# Patient Record
Sex: MALE | Race: WHITE | NOT HISPANIC OR LATINO | Employment: FULL TIME | RURAL
[De-identification: names, ages, dates, MRNs, and addresses within clinical notes are randomized per-mention and may not be internally consistent; named-entity substitution may affect disease eponyms.]

---

## 2022-03-08 ENCOUNTER — OUTSIDE PLACE OF SERVICE (OUTPATIENT)
Dept: CARDIOLOGY | Facility: CLINIC | Age: 62
End: 2022-03-08
Payer: COMMERCIAL

## 2022-03-08 PROCEDURE — 93306 PR ECHO HEART XTHORACIC,COMPLETE W DOPPLER: ICD-10-PCS | Mod: 26,,, | Performed by: INTERNAL MEDICINE

## 2022-03-08 PROCEDURE — 93306 TTE W/DOPPLER COMPLETE: CPT | Mod: 26,,, | Performed by: INTERNAL MEDICINE

## 2022-03-10 ENCOUNTER — OFFICE VISIT (OUTPATIENT)
Dept: CARDIOLOGY | Facility: CLINIC | Age: 62
End: 2022-03-10
Payer: COMMERCIAL

## 2022-03-10 VITALS
DIASTOLIC BLOOD PRESSURE: 46 MMHG | BODY MASS INDEX: 27.16 KG/M2 | WEIGHT: 163 LBS | SYSTOLIC BLOOD PRESSURE: 104 MMHG | OXYGEN SATURATION: 95 % | HEART RATE: 64 BPM | HEIGHT: 65 IN

## 2022-03-10 DIAGNOSIS — I48.91 ATRIAL FIBRILLATION, UNSPECIFIED TYPE: Primary | ICD-10-CM

## 2022-03-10 DIAGNOSIS — N40.0 BENIGN PROSTATIC HYPERPLASIA, UNSPECIFIED WHETHER LOWER URINARY TRACT SYMPTOMS PRESENT: ICD-10-CM

## 2022-03-10 PROCEDURE — 1159F MED LIST DOCD IN RCRD: CPT | Mod: CPTII,,, | Performed by: INTERNAL MEDICINE

## 2022-03-10 PROCEDURE — 3078F PR MOST RECENT DIASTOLIC BLOOD PRESSURE < 80 MM HG: ICD-10-PCS | Mod: CPTII,,, | Performed by: INTERNAL MEDICINE

## 2022-03-10 PROCEDURE — 1160F PR REVIEW ALL MEDS BY PRESCRIBER/CLIN PHARMACIST DOCUMENTED: ICD-10-PCS | Mod: CPTII,,, | Performed by: INTERNAL MEDICINE

## 2022-03-10 PROCEDURE — 3074F SYST BP LT 130 MM HG: CPT | Mod: CPTII,,, | Performed by: INTERNAL MEDICINE

## 2022-03-10 PROCEDURE — 3008F BODY MASS INDEX DOCD: CPT | Mod: CPTII,,, | Performed by: INTERNAL MEDICINE

## 2022-03-10 PROCEDURE — 93246 EXT ECG>7D<15D RECORDING: CPT | Performed by: INTERNAL MEDICINE

## 2022-03-10 PROCEDURE — 3078F DIAST BP <80 MM HG: CPT | Mod: CPTII,,, | Performed by: INTERNAL MEDICINE

## 2022-03-10 PROCEDURE — 1159F PR MEDICATION LIST DOCUMENTED IN MEDICAL RECORD: ICD-10-PCS | Mod: CPTII,,, | Performed by: INTERNAL MEDICINE

## 2022-03-10 PROCEDURE — 3008F PR BODY MASS INDEX (BMI) DOCUMENTED: ICD-10-PCS | Mod: CPTII,,, | Performed by: INTERNAL MEDICINE

## 2022-03-10 PROCEDURE — 3074F PR MOST RECENT SYSTOLIC BLOOD PRESSURE < 130 MM HG: ICD-10-PCS | Mod: CPTII,,, | Performed by: INTERNAL MEDICINE

## 2022-03-10 PROCEDURE — 99214 OFFICE O/P EST MOD 30 MIN: CPT | Mod: ,,, | Performed by: INTERNAL MEDICINE

## 2022-03-10 PROCEDURE — 1160F RVW MEDS BY RX/DR IN RCRD: CPT | Mod: CPTII,,, | Performed by: INTERNAL MEDICINE

## 2022-03-10 PROCEDURE — 99214 PR OFFICE/OUTPT VISIT, EST, LEVL IV, 30-39 MIN: ICD-10-PCS | Mod: ,,, | Performed by: INTERNAL MEDICINE

## 2022-03-10 RX ORDER — TAMSULOSIN HYDROCHLORIDE 0.4 MG/1
CAPSULE ORAL DAILY
COMMUNITY

## 2022-03-10 RX ORDER — DULOXETIN HYDROCHLORIDE 20 MG/1
20 CAPSULE, DELAYED RELEASE ORAL 2 TIMES DAILY
COMMUNITY

## 2022-03-10 RX ORDER — ASPIRIN 81 MG/1
81 TABLET ORAL DAILY
Status: ON HOLD | COMMUNITY
End: 2022-12-14 | Stop reason: HOSPADM

## 2022-03-10 RX ORDER — METOPROLOL SUCCINATE 50 MG/1
50 TABLET, EXTENDED RELEASE ORAL DAILY
COMMUNITY
End: 2022-11-29 | Stop reason: ALTCHOICE

## 2022-03-17 NOTE — PROGRESS NOTES
Cardiology Progress Note    Referring Physician: Pippa Portillo DO  Primary Care Physician:  Pippa Portillo DO    Reason for Referral: A fib with RVR    Chief Complaint: Palpitations    History of Present Illness: PT reports was resting comfortably watching TV, had sudden onset of his heart racing, associated with mild shortness of breath.  He notes lasted fifteen to twenty minutes before he sought care in ER.  Was found to be in A fib with RVR, with intermitent pauses, was placed on IV diltiazem with some improvement in symptoms.  He was watched overnight, thinks he stayed in A fib for six to eight hours before he converted back to NSR.  He did not require DC cardioversion.  He has not had reoccurence of those symptoms. He has no previous history of  A fib, cardiac issues or evaluations.  He has been under increased stress with recent death of his mother, had attended her  earlier in the day. He denies chest pain, pressure or diaphoresis with this event or with normal activites.    Review of Systems:  Review of Systems   Constitutional: Negative.    Eyes: Negative.    Respiratory: Positive for shortness of breath. Negative for cough, hemoptysis, sputum production and wheezing.    Cardiovascular: Positive for palpitations. Negative for chest pain, orthopnea, claudication, leg swelling and PND.   Gastrointestinal: Negative.    Genitourinary: Negative.    Musculoskeletal: Negative.    Skin: Negative.    Neurological: Negative.    Endo/Heme/Allergies: Negative.    Psychiatric/Behavioral: Positive for depression. Negative for hallucinations, memory loss, substance abuse and suicidal ideas. The patient is not nervous/anxious and does not have insomnia.         History reviewed. No pertinent past medical history.     History reviewed. No pertinent surgical history.    Current Outpatient Medications:     aspirin (ECOTRIN) 81 MG EC tablet, Take 81 mg by mouth once daily., Disp: , Rfl:     DULoxetine (CYMBALTA) 20  "MG capsule, Take 20 mg by mouth 2 (two) times daily., Disp: , Rfl:     metoprolol succinate (TOPROL-XL) 50 MG 24 hr tablet, Take 50 mg by mouth once daily., Disp: , Rfl:     tamsulosin (FLOMAX) 0.4 mg Cap, Take by mouth once daily., Disp: , Rfl:     Social History     Socioeconomic History    Marital status:    Tobacco Use    Smoking status: Never Smoker    Smokeless tobacco: Never Used       Echocardiogram:   No results found for this or any previous visit.      Stress Test:  No results found for this or any previous visit.       Last catheterization:  No results found for this or any previous visit.       Physical Exam:  VS: BP (!) 104/46 (BP Location: Left arm)   Pulse 64   Ht 5' 5" (1.651 m)   Wt 73.9 kg (163 lb)   SpO2 95%   BMI 27.12 kg/m²   Physical Exam   Constitutional: He is oriented to person, place, and time.   HENT:   Head: Normocephalic and atraumatic.   Right Ear: Tympanic membrane normal.   Left Ear: Tympanic membrane normal.   Nose: Nose normal.   Mouth/Throat: Mucous membranes are dry. No oropharyngeal exudate or posterior oropharyngeal erythema. Oropharynx is clear.   Eyes: Pupils are equal, round, and reactive to light. Conjunctivae are normal. No scleral icterus.   Neck: Carotid bruit is not present.   Cardiovascular: Normal rate, regular rhythm, normal heart sounds and normal pulses. Exam reveals no gallop and no friction rub.   No murmur heard.  Pulmonary/Chest: Effort normal and breath sounds normal. He has no wheezes. He has no rhonchi. He has no rales. He exhibits no tenderness.   Abdominal: Soft. Bowel sounds are normal. He exhibits no distension. There is no abdominal tenderness. There is guarding.   Musculoskeletal:         General: No swelling or deformity. Normal range of motion.      Cervical back: Normal range of motion and neck supple.      Right lower leg: No edema.      Left lower leg: No edema.   Lymphadenopathy:     He has no cervical adenopathy.   Neurological: " He is alert and oriented to person, place, and time. No cranial nerve deficit. Coordination abnormal. Gait normal.   Skin: Skin is warm and dry. Capillary refill takes less than 2 seconds. No bruising noted. No jaundice.   Psychiatric: His behavior is normal. Mood normal.        Diagnostic studies reviewed up until the time of this note.     ECG and cardiac telemetry reviewed.     Assessment and Plan      Atrial fibrillation, unspecified type  -     Cardiac Monitor - 3-15 Day Adult (Cupid Only); Future  -     Exercise Stress - EKG; Future; Expected date: 03/24/2022       1. Paroxysmal A fib, back in NSR on metoprolol, tolerating metoprolol and ASA, will order stress imaging to evaluate for ischemic substrate, echo to eval for structural heart disese, left atrial size.  2. BPH: on flowmax             Edwardo Peraza DO, FACC, FACOI  [facility]

## 2022-03-24 ENCOUNTER — OUTSIDE PLACE OF SERVICE (OUTPATIENT)
Dept: CARDIOLOGY | Facility: CLINIC | Age: 62
End: 2022-03-24
Payer: COMMERCIAL

## 2022-03-24 PROCEDURE — 93018 CV STRESS TEST I&R ONLY: CPT | Mod: ,,, | Performed by: INTERNAL MEDICINE

## 2022-03-24 PROCEDURE — 93018 PR CARDIAC STRESS TST,INTERP/REPT ONLY: ICD-10-PCS | Mod: ,,, | Performed by: INTERNAL MEDICINE

## 2022-03-24 PROCEDURE — 93016 CV STRESS TEST SUPVJ ONLY: CPT | Mod: ,,, | Performed by: INTERNAL MEDICINE

## 2022-03-24 PROCEDURE — 93016 PR CARDIAC STRESS TST,DR SUPERV ONLY: ICD-10-PCS | Mod: ,,, | Performed by: INTERNAL MEDICINE

## 2022-04-07 ENCOUNTER — OFFICE VISIT (OUTPATIENT)
Dept: CARDIOLOGY | Facility: CLINIC | Age: 62
End: 2022-04-07
Payer: COMMERCIAL

## 2022-04-07 VITALS — HEART RATE: 64 BPM | OXYGEN SATURATION: 100 % | HEIGHT: 65 IN | BODY MASS INDEX: 27.32 KG/M2 | WEIGHT: 164 LBS

## 2022-04-07 DIAGNOSIS — I48.0 PAROXYSMAL ATRIAL FIBRILLATION: Primary | ICD-10-CM

## 2022-04-07 PROCEDURE — 99213 PR OFFICE/OUTPT VISIT, EST, LEVL III, 20-29 MIN: ICD-10-PCS | Mod: ,,, | Performed by: INTERNAL MEDICINE

## 2022-04-07 PROCEDURE — 3008F BODY MASS INDEX DOCD: CPT | Mod: CPTII,,, | Performed by: INTERNAL MEDICINE

## 2022-04-07 PROCEDURE — 1159F PR MEDICATION LIST DOCUMENTED IN MEDICAL RECORD: ICD-10-PCS | Mod: CPTII,,, | Performed by: INTERNAL MEDICINE

## 2022-04-07 PROCEDURE — 1159F MED LIST DOCD IN RCRD: CPT | Mod: CPTII,,, | Performed by: INTERNAL MEDICINE

## 2022-04-07 PROCEDURE — 99213 OFFICE O/P EST LOW 20 MIN: CPT | Mod: ,,, | Performed by: INTERNAL MEDICINE

## 2022-04-07 PROCEDURE — 1160F PR REVIEW ALL MEDS BY PRESCRIBER/CLIN PHARMACIST DOCUMENTED: ICD-10-PCS | Mod: CPTII,,, | Performed by: INTERNAL MEDICINE

## 2022-04-07 PROCEDURE — 1160F RVW MEDS BY RX/DR IN RCRD: CPT | Mod: CPTII,,, | Performed by: INTERNAL MEDICINE

## 2022-04-07 PROCEDURE — 3008F PR BODY MASS INDEX (BMI) DOCUMENTED: ICD-10-PCS | Mod: CPTII,,, | Performed by: INTERNAL MEDICINE

## 2022-04-07 NOTE — PROGRESS NOTES
Cardiology Clinic Note:    PCP: Pippa Portillo DO    REFERRING PHYSICIAN: Pippa Portillo DO    CHIEF COMPLAINT: Palpitations     HISTORY OF PRESENT ILLNESS: Roberto Lara is a 61 y.o. male who presents for evaluation of atrial fibrillation    Patient states he is doing well.  Reports no further episodes of heart racing and skipping.  He states he is walking the dog daily. Denies any heart racing, skipping or jumping.   He denies chest pain, pressure or diaphoresis.        Review of Systems   Constitutional: Negative for diaphoresis, malaise/fatigue, night sweats and weight gain.   HENT: Negative for congestion, ear pain, hearing loss, nosebleeds and sore throat.    Eyes: Negative for blurred vision, double vision, pain, photophobia and visual disturbance.   Cardiovascular: Positive for palpitations. Negative for chest pain, claudication, dyspnea on exertion, irregular heartbeat, leg swelling, near-syncope, orthopnea and syncope.   Respiratory: Positive for shortness of breath. Negative for cough, sleep disturbances due to breathing, snoring and wheezing.    Endocrine: Negative for cold intolerance, heat intolerance, polydipsia, polyphagia and polyuria.   Hematologic/Lymphatic: Negative for bleeding problem. Does not bruise/bleed easily.   Skin: Negative for dry skin, flushing, itching, rash and skin cancer.   Musculoskeletal: Negative for arthritis, back pain, falls, joint pain, muscle cramps, muscle weakness and myalgias.   Gastrointestinal: Negative for abdominal pain, change in bowel habit, constipation, diarrhea, dysphagia, heartburn, nausea and vomiting.   Genitourinary: Negative for bladder incontinence, dysuria, flank pain, frequency and nocturia.   Neurological: Negative for dizziness, focal weakness, headaches, light-headedness, loss of balance, numbness, paresthesias and seizures.   Psychiatric/Behavioral: Positive for depression. Negative for memory loss and substance abuse. The patient is not  "nervous/anxious.    Allergic/Immunologic: Negative for environmental allergies.          PAST MEDICAL HISTORY:  Palpitations    PAST SURGICAL HISTORY:  No past surgical history on file.    SOCIAL HISTORY:  Social History     Socioeconomic History    Marital status:    Tobacco Use    Smoking status: Never Smoker    Smokeless tobacco: Never Used       FAMILY HISTORY:  Family History   Problem Relation Age of Onset    Heart attack Mother     Hypertension Mother     Heart attack Paternal Uncle     Heart attack Maternal Grandfather        ALLERGIES:  Allergies as of 04/07/2022    (No Known Allergies)         MEDICATIONS:  Current Outpatient Medications on File Prior to Visit   Medication Sig Dispense Refill    aspirin (ECOTRIN) 81 MG EC tablet Take 81 mg by mouth once daily.      DULoxetine (CYMBALTA) 20 MG capsule Take 20 mg by mouth 2 (two) times daily.      metoprolol succinate (TOPROL-XL) 50 MG 24 hr tablet Take 50 mg by mouth once daily.      tamsulosin (FLOMAX) 0.4 mg Cap Take by mouth once daily.       No current facility-administered medications on file prior to visit.          PHYSICAL EXAM:  Pulse 64, height 5' 5" (1.651 m), weight 74.4 kg (164 lb), SpO2 100 %.  Wt Readings from Last 3 Encounters:   04/07/22 74.4 kg (164 lb)   03/10/22 73.9 kg (163 lb)      Body mass index is 27.29 kg/m².    Physical Exam  Vitals and nursing note reviewed.   Constitutional:       Appearance: Normal appearance. He is normal weight.   HENT:      Head: Normocephalic and atraumatic.      Right Ear: External ear normal.      Left Ear: External ear normal.   Eyes:      General: No scleral icterus.        Right eye: No discharge.         Left eye: No discharge.      Extraocular Movements: Extraocular movements intact.      Conjunctiva/sclera: Conjunctivae normal.      Pupils: Pupils are equal, round, and reactive to light.   Cardiovascular:      Rate and Rhythm: Normal rate and regular rhythm.      Pulses: Normal " pulses.      Heart sounds: Normal heart sounds. No murmur heard.    No friction rub. No gallop.   Pulmonary:      Effort: Pulmonary effort is normal.      Breath sounds: Normal breath sounds. No wheezing, rhonchi or rales.   Chest:      Chest wall: No tenderness.   Abdominal:      General: Abdomen is flat. Bowel sounds are normal. There is no distension.      Palpations: Abdomen is soft.      Tenderness: There is no abdominal tenderness. There is no guarding or rebound.   Musculoskeletal:         General: No swelling or tenderness. Normal range of motion.      Cervical back: Normal range of motion and neck supple.   Skin:     General: Skin is warm and dry.      Findings: No erythema or rash.   Neurological:      General: No focal deficit present.      Mental Status: He is alert and oriented to person, place, and time.      Cranial Nerves: No cranial nerve deficit.      Motor: No weakness.      Gait: Gait normal.   Psychiatric:         Mood and Affect: Mood normal.         Behavior: Behavior normal.         Thought Content: Thought content normal.         Judgment: Judgment normal.          LABS REVIEWED:  No results found for: WBC, RBC, HGB, HCT, MCV, MCH, MCHC, RDW, PLT, MPV, NRBC, INR  No results found for: NA, K, CL, CO2, BUN, MG, PHOS  No results found for: CPK, AST, ALT  No results found for: GLU, HGBA1C  No results found for: CHOL, HDL, TRIG, CHOLHDL    CARDIAC STUDIES REVIEWED:  EKG 3/10/22 - Afib with rapid ventricular rate.   ECHO 3/11/22 -  EF 55-60%.  Mild mitral regurgitation and mild tricuspid regurgitation   STRESS TEST 3/24/22 -   ZIO 3/31/22 -   Predominant NSR.  Two short runs of SVT,  Very rare PACs and PVCs.   No significant pauses.  Patient trigger events with NSR and PVCs.     OTHER IMAGING STUDIES REVIEWED:    ASSESSMENT:   There are no diagnoses linked to this encounter.      PLAN:  1. Paroxysmal A fib, back in NSR on metoprolol, tolerating metoprolol and ASA,  Normal echo and stress test  reviewed, continue current meds..

## 2022-04-12 PROBLEM — I48.0 PAROXYSMAL ATRIAL FIBRILLATION: Status: ACTIVE | Noted: 2022-04-12

## 2022-10-27 ENCOUNTER — OFFICE VISIT (OUTPATIENT)
Dept: CARDIOLOGY | Facility: CLINIC | Age: 62
End: 2022-10-27
Payer: COMMERCIAL

## 2022-10-27 VITALS
OXYGEN SATURATION: 97 % | DIASTOLIC BLOOD PRESSURE: 57 MMHG | RESPIRATION RATE: 20 BRPM | WEIGHT: 162 LBS | BODY MASS INDEX: 26.99 KG/M2 | HEART RATE: 61 BPM | HEIGHT: 65 IN | SYSTOLIC BLOOD PRESSURE: 121 MMHG

## 2022-10-27 DIAGNOSIS — I48.0 PAROXYSMAL ATRIAL FIBRILLATION: Primary | ICD-10-CM

## 2022-10-27 PROCEDURE — 1159F PR MEDICATION LIST DOCUMENTED IN MEDICAL RECORD: ICD-10-PCS | Mod: CPTII,,, | Performed by: INTERNAL MEDICINE

## 2022-10-27 PROCEDURE — 3078F PR MOST RECENT DIASTOLIC BLOOD PRESSURE < 80 MM HG: ICD-10-PCS | Mod: CPTII,,, | Performed by: INTERNAL MEDICINE

## 2022-10-27 PROCEDURE — 99213 OFFICE O/P EST LOW 20 MIN: CPT | Mod: ,,, | Performed by: INTERNAL MEDICINE

## 2022-10-27 PROCEDURE — 3078F DIAST BP <80 MM HG: CPT | Mod: CPTII,,, | Performed by: INTERNAL MEDICINE

## 2022-10-27 PROCEDURE — 1160F RVW MEDS BY RX/DR IN RCRD: CPT | Mod: CPTII,,, | Performed by: INTERNAL MEDICINE

## 2022-10-27 PROCEDURE — 1160F PR REVIEW ALL MEDS BY PRESCRIBER/CLIN PHARMACIST DOCUMENTED: ICD-10-PCS | Mod: CPTII,,, | Performed by: INTERNAL MEDICINE

## 2022-10-27 PROCEDURE — 3074F PR MOST RECENT SYSTOLIC BLOOD PRESSURE < 130 MM HG: ICD-10-PCS | Mod: CPTII,,, | Performed by: INTERNAL MEDICINE

## 2022-10-27 PROCEDURE — 3074F SYST BP LT 130 MM HG: CPT | Mod: CPTII,,, | Performed by: INTERNAL MEDICINE

## 2022-10-27 PROCEDURE — 1159F MED LIST DOCD IN RCRD: CPT | Mod: CPTII,,, | Performed by: INTERNAL MEDICINE

## 2022-10-27 PROCEDURE — 99213 PR OFFICE/OUTPT VISIT, EST, LEVL III, 20-29 MIN: ICD-10-PCS | Mod: ,,, | Performed by: INTERNAL MEDICINE

## 2022-10-28 NOTE — PROGRESS NOTES
Cardiology Clinic Note:    PCP: Pippa Portillo DO    REFERRING PHYSICIAN: Pippa Portillo DO    CHIEF COMPLAINT: Palpitations     HISTORY OF PRESENT ILLNESS: Roberto Lara is a 62 y.o. male who presents for evaluation of atrial fibrillation    Patient states heart racing and skipping has resolved..  He states he is walking the dog daily without provocation of heart racing, skipping or jumping.   He denies chest pain, pressure or diaphoresis.  He notes shortness of breath with  a fib with RVR has resolved.        Review of Systems   Constitutional: Negative for diaphoresis, malaise/fatigue, night sweats and weight gain.   HENT:  Negative for congestion, ear pain, hearing loss, nosebleeds and sore throat.    Eyes:  Negative for blurred vision, double vision, pain, photophobia and visual disturbance.   Cardiovascular:  Positive for palpitations. Negative for chest pain, claudication, dyspnea on exertion, irregular heartbeat, leg swelling, near-syncope, orthopnea and syncope.   Respiratory:  Positive for shortness of breath. Negative for cough, sleep disturbances due to breathing, snoring and wheezing.    Endocrine: Negative for cold intolerance, heat intolerance, polydipsia, polyphagia and polyuria.   Hematologic/Lymphatic: Negative for bleeding problem. Does not bruise/bleed easily.   Skin:  Negative for dry skin, flushing, itching, rash and skin cancer.   Musculoskeletal:  Negative for arthritis, back pain, falls, joint pain, muscle cramps, muscle weakness and myalgias.   Gastrointestinal:  Negative for abdominal pain, change in bowel habit, constipation, diarrhea, dysphagia, heartburn, nausea and vomiting.   Genitourinary:  Negative for bladder incontinence, dysuria, flank pain, frequency and nocturia.   Neurological:  Negative for dizziness, focal weakness, headaches, light-headedness, loss of balance, numbness, paresthesias and seizures.   Psychiatric/Behavioral:  Positive for depression. Negative for memory  "loss and substance abuse. The patient is not nervous/anxious.    Allergic/Immunologic: Negative for environmental allergies.        PAST MEDICAL HISTORY:  Palpitations    PAST SURGICAL HISTORY:  Past Surgical History:   Procedure Laterality Date    FOOT FRACTURE SURGERY Right        SOCIAL HISTORY:  Social History     Socioeconomic History    Marital status:    Tobacco Use    Smoking status: Never    Smokeless tobacco: Never       FAMILY HISTORY:  Family History   Problem Relation Age of Onset    Heart attack Mother     Hypertension Mother     Heart attack Paternal Uncle     Heart attack Maternal Grandfather        ALLERGIES:  Allergies as of 10/27/2022    (No Known Allergies)         MEDICATIONS:  Current Outpatient Medications on File Prior to Visit   Medication Sig Dispense Refill    aspirin (ECOTRIN) 81 MG EC tablet Take 81 mg by mouth once daily.      DULoxetine (CYMBALTA) 20 MG capsule Take 20 mg by mouth 2 (two) times daily.      metoprolol succinate (TOPROL-XL) 50 MG 24 hr tablet Take 50 mg by mouth once daily.      tamsulosin (FLOMAX) 0.4 mg Cap Take by mouth once daily.       No current facility-administered medications on file prior to visit.          PHYSICAL EXAM:  Blood pressure (!) 121/57, pulse 61, resp. rate 20, height 5' 5" (1.651 m), weight 73.5 kg (162 lb), SpO2 97 %.  Wt Readings from Last 3 Encounters:   10/27/22 73.5 kg (162 lb)   04/07/22 74.4 kg (164 lb)   03/10/22 73.9 kg (163 lb)      Body mass index is 26.96 kg/m².    Physical Exam  Vitals and nursing note reviewed.   Constitutional:       Appearance: Normal appearance. He is normal weight.   HENT:      Head: Normocephalic and atraumatic.      Right Ear: External ear normal.      Left Ear: External ear normal.   Eyes:      General: No scleral icterus.        Right eye: No discharge.         Left eye: No discharge.      Extraocular Movements: Extraocular movements intact.      Conjunctiva/sclera: Conjunctivae normal.      Pupils: " Pupils are equal, round, and reactive to light.   Cardiovascular:      Rate and Rhythm: Normal rate and regular rhythm.      Pulses: Normal pulses.      Heart sounds: Normal heart sounds. No murmur heard.    No friction rub. No gallop.   Pulmonary:      Effort: Pulmonary effort is normal.      Breath sounds: Normal breath sounds. No wheezing, rhonchi or rales.   Chest:      Chest wall: No tenderness.   Abdominal:      General: Abdomen is flat. Bowel sounds are normal. There is no distension.      Palpations: Abdomen is soft.      Tenderness: There is no abdominal tenderness. There is no guarding or rebound.   Musculoskeletal:         General: No swelling or tenderness. Normal range of motion.      Cervical back: Normal range of motion and neck supple.   Skin:     General: Skin is warm and dry.      Findings: No erythema or rash.   Neurological:      General: No focal deficit present.      Mental Status: He is alert and oriented to person, place, and time.      Cranial Nerves: No cranial nerve deficit.      Motor: No weakness.      Gait: Gait normal.   Psychiatric:         Mood and Affect: Mood normal.         Behavior: Behavior normal.         Thought Content: Thought content normal.         Judgment: Judgment normal.        LABS REVIEWED:  No results found for: WBC, RBC, HGB, HCT, MCV, MCH, MCHC, RDW, PLT, MPV, NRBC, INR  No results found for: NA, K, CL, CO2, BUN, MG, PHOS  No results found for: CPK, AST, ALT  No results found for: GLU, HGBA1C  No results found for: CHOL, HDL, LDL, TRIG, CHOLHDL    CARDIAC STUDIES REVIEWED:  EKG 3/10/22 - Afib with rapid ventricular rate.   ECHO 3/11/22 -  EF 55-60%.  Mild mitral regurgitation and mild tricuspid regurgitation   STRESS TEST 3/24/22 -   ZIO 3/31/22 -   Predominant NSR.  Two short runs of SVT,  Very rare PACs and PVCs.   No significant pauses.  Patient trigger events with NSR and PVCs.     OTHER IMAGING STUDIES REVIEWED:    ASSESSMENT:   There are no diagnoses linked  to this encounter.      PLAN:  1. Paroxysmal A fib, back in NSR on metoprolol, remains asymptomatic, reviewed  Normal echo and stress test reviewed, on ASA only for primary stroke risk reduction with PAF.

## 2022-11-15 ENCOUNTER — HOSPITAL ENCOUNTER (OUTPATIENT)
Dept: RADIOLOGY | Facility: HOSPITAL | Age: 62
Discharge: HOME | End: 2022-11-15
Attending: ORTHOPAEDIC SURGERY
Payer: COMMERCIAL

## 2022-11-15 DIAGNOSIS — M25.562 ACUTE PAIN OF LEFT KNEE: ICD-10-CM

## 2022-11-15 PROBLEM — M25.569 ACUTE KNEE PAIN: Status: ACTIVE | Noted: 2022-11-15

## 2022-11-15 PROCEDURE — 73564 X-RAY EXAM KNEE 4 OR MORE: CPT | Mod: TC,LT

## 2022-11-15 NOTE — H&P (VIEW-ONLY)
CC:   Chief Complaint   Patient presents with    Left Knee - Pain        PREVIOUS INFO:        HISTORY:   11/15/2022    Roberto Lara  is a 62 y.o. slowly increasing left knee pain over the years he works in [employer] up at [facility].  He is had slowly increasing left knee pain throughout the years hurts at night stiffness on rising hurts with activities.      PAST MEDICAL HISTORY: No past medical history on file.       PAST SURGICAL HISTORY:   Past Surgical History:   Procedure Laterality Date    FOOT FRACTURE SURGERY Right           ALLERGIES: Review of patient's allergies indicates:  No Known Allergies     MEDICATIONS :    Current Outpatient Medications:     aspirin (ECOTRIN) 81 MG EC tablet, Take 81 mg by mouth once daily., Disp: , Rfl:     DULoxetine (CYMBALTA) 20 MG capsule, Take 20 mg by mouth 2 (two) times daily., Disp: , Rfl:     metoprolol succinate (TOPROL-XL) 50 MG 24 hr tablet, Take 50 mg by mouth once daily., Disp: , Rfl:     tamsulosin (FLOMAX) 0.4 mg Cap, Take by mouth once daily., Disp: , Rfl:      SOCIAL HISTORY:   Social History     Socioeconomic History    Marital status:    Tobacco Use    Smoking status: Never    Smokeless tobacco: Never        ROS    FAMILY HISTORY:   Family History   Problem Relation Age of Onset    Heart attack Mother     Hypertension Mother     Heart attack Paternal Uncle     Heart attack Maternal Grandfather           PHYSICAL EXAM: There were no vitals filed for this visit.            There is no height or weight on file to calculate BMI.     In general, this is a well-developed, well-nourished male . The patient is alert, oriented and cooperative.      HEENT:  Normocephalic, atraumatic.  Extraocular movements are intact bilaterally.  The oropharynx is benign.       NECK:  Nontender with good range of motion.      PULMONARY: Respirations are even and non-labored.       CARDIOVASCULAR: Pulses regular by peripheral palpation.        ABDOMEN:  Soft, non-tender, non-distended.        EXTREMITIES:  2+ posterior tibial pulse he has full extension flexion about 01/21/2030 but with pain there is grinding with range of motion he has slight valgus deformity    Ortho Exam      RADIOGRAPHIC FINDINGS:  Standing x-rays left knee with the sunrise view in addition total 4 films show significant degenerative changes lateral compartment knee with sclerotic changes probable 2 loose body seen posteriorly inferior from the knee joint no fracture dislocation seen significant DJD left knee      MRI left knee dated 11/10/2022 at [facility]  Torn mediolateral meniscus  Osteoarthritis severe involving the lateral compartment of the knee  Small effusion with popliteal cyst 2 large loose bodies present  Partial PCL injury intact collateral ligaments      .      IMPRESSION:  Left knee DJD risks benefits discussed at length    PLAN:  Information on total knee replacement discussed left knee need clearance through Dr. Peraza his cardiologist had AFib 1st time event in February this year none since      I had a long discussion with the patient about treatment options, including operative and nonoperative treatments. We discussed pros and cons of each including risks pertinent to surgery including pain, infection, bleeding, damage to adjacent structures like nerves and blood vessels, failure to heal, need for future surgeries, stiffness, instability, loss of limb, anesthesia risks like stroke, blood clot, loss of life. We discussed the possibility of need for later hardware removal in the case that hardware was used. We discussed common and uncommon risks, and discussed patient specific factors that may increase the risks present with surgery. All questions were answered. The patient expressed understanding of the pros and cons of surgery and wanted to proceed with surgical treatment.       No follow-ups on file.         Ron Fitzpatrick III      (Subject to voice  recognition error, transcription service not allowed)

## 2022-11-28 ENCOUNTER — OFFICE VISIT (OUTPATIENT)
Dept: CARDIOLOGY | Facility: CLINIC | Age: 62
End: 2022-11-28
Payer: COMMERCIAL

## 2022-11-28 VITALS
BODY MASS INDEX: 44.15 KG/M2 | DIASTOLIC BLOOD PRESSURE: 82 MMHG | SYSTOLIC BLOOD PRESSURE: 140 MMHG | HEART RATE: 64 BPM | WEIGHT: 265 LBS | OXYGEN SATURATION: 96 % | HEIGHT: 65 IN

## 2022-11-28 DIAGNOSIS — I48.0 PAROXYSMAL ATRIAL FIBRILLATION: Primary | ICD-10-CM

## 2022-11-28 DIAGNOSIS — G89.29 CHRONIC PAIN OF LEFT KNEE: ICD-10-CM

## 2022-11-28 DIAGNOSIS — R06.81 APNEA: ICD-10-CM

## 2022-11-28 DIAGNOSIS — G47.33 OBSTRUCTIVE SLEEP APNEA OF ADULT: ICD-10-CM

## 2022-11-28 DIAGNOSIS — M25.562 CHRONIC PAIN OF LEFT KNEE: ICD-10-CM

## 2022-11-28 PROCEDURE — 3077F PR MOST RECENT SYSTOLIC BLOOD PRESSURE >= 140 MM HG: ICD-10-PCS | Mod: CPTII,,, | Performed by: INTERNAL MEDICINE

## 2022-11-28 PROCEDURE — 1159F PR MEDICATION LIST DOCUMENTED IN MEDICAL RECORD: ICD-10-PCS | Mod: CPTII,,, | Performed by: INTERNAL MEDICINE

## 2022-11-28 PROCEDURE — 99214 OFFICE O/P EST MOD 30 MIN: CPT | Mod: PBBFAC | Performed by: INTERNAL MEDICINE

## 2022-11-28 PROCEDURE — 93005 ELECTROCARDIOGRAM TRACING: CPT | Mod: PBBFAC | Performed by: INTERNAL MEDICINE

## 2022-11-28 PROCEDURE — 3079F DIAST BP 80-89 MM HG: CPT | Mod: CPTII,,, | Performed by: INTERNAL MEDICINE

## 2022-11-28 PROCEDURE — 99214 OFFICE O/P EST MOD 30 MIN: CPT | Mod: S$PBB,,, | Performed by: INTERNAL MEDICINE

## 2022-11-28 PROCEDURE — 99214 PR OFFICE/OUTPT VISIT, EST, LEVL IV, 30-39 MIN: ICD-10-PCS | Mod: S$PBB,,, | Performed by: INTERNAL MEDICINE

## 2022-11-28 PROCEDURE — 3008F BODY MASS INDEX DOCD: CPT | Mod: CPTII,,, | Performed by: INTERNAL MEDICINE

## 2022-11-28 PROCEDURE — 1160F RVW MEDS BY RX/DR IN RCRD: CPT | Mod: CPTII,,, | Performed by: INTERNAL MEDICINE

## 2022-11-28 PROCEDURE — 93010 EKG 12-LEAD: ICD-10-PCS | Mod: S$PBB,,, | Performed by: INTERNAL MEDICINE

## 2022-11-28 PROCEDURE — 3079F PR MOST RECENT DIASTOLIC BLOOD PRESSURE 80-89 MM HG: ICD-10-PCS | Mod: CPTII,,, | Performed by: INTERNAL MEDICINE

## 2022-11-28 PROCEDURE — 1160F PR REVIEW ALL MEDS BY PRESCRIBER/CLIN PHARMACIST DOCUMENTED: ICD-10-PCS | Mod: CPTII,,, | Performed by: INTERNAL MEDICINE

## 2022-11-28 PROCEDURE — 1159F MED LIST DOCD IN RCRD: CPT | Mod: CPTII,,, | Performed by: INTERNAL MEDICINE

## 2022-11-28 PROCEDURE — 3077F SYST BP >= 140 MM HG: CPT | Mod: CPTII,,, | Performed by: INTERNAL MEDICINE

## 2022-11-28 PROCEDURE — 93010 ELECTROCARDIOGRAM REPORT: CPT | Mod: S$PBB,,, | Performed by: INTERNAL MEDICINE

## 2022-11-28 PROCEDURE — 3008F PR BODY MASS INDEX (BMI) DOCUMENTED: ICD-10-PCS | Mod: CPTII,,, | Performed by: INTERNAL MEDICINE

## 2022-12-05 PROBLEM — M25.562 CHRONIC PAIN OF LEFT KNEE: Status: ACTIVE | Noted: 2022-11-15

## 2022-12-05 PROBLEM — G89.29 CHRONIC PAIN OF LEFT KNEE: Status: ACTIVE | Noted: 2022-11-15

## 2022-12-05 PROBLEM — G47.33 OBSTRUCTIVE SLEEP APNEA OF ADULT: Status: ACTIVE | Noted: 2022-12-05

## 2022-12-05 PROBLEM — R06.81 APNEA: Status: ACTIVE | Noted: 2022-12-05

## 2022-12-06 NOTE — PROGRESS NOTES
Cardiology Clinic Note:    PCP: Pippa Portillo DO    REFERRING PHYSICIAN: Pippa Portillo DO    CHIEF COMPLAINT: Palpitations     HISTORY OF PRESENT ILLNESS: Roberto Lara is a 62 y.o. male who presents for evaluation of atrial fibrillation    Patient states heart racing and skipping has reoccurred, worsens throughout the day, started taking an extra one half of Toprol 25 XL in the evening which has helped..  He states he is walking the dog daily without provocation of heart racing, skipping or jumping.  He notes increasing left knee pain, is undergoing evaluation for knee replacement.  He denies chest pain, pressure or diaphoresis.  He notes shortness of breath with  a fib with RVR has resolved.        Review of Systems   Constitutional: Negative for diaphoresis, malaise/fatigue, night sweats and weight gain.   HENT:  Negative for congestion, ear pain, hearing loss, nosebleeds and sore throat.    Eyes:  Negative for blurred vision, double vision, pain, photophobia and visual disturbance.   Cardiovascular:  Positive for palpitations. Negative for chest pain, claudication, dyspnea on exertion, irregular heartbeat, leg swelling, near-syncope, orthopnea and syncope.   Respiratory:  Positive for shortness of breath. Negative for cough, sleep disturbances due to breathing, snoring and wheezing.    Endocrine: Negative for cold intolerance, heat intolerance, polydipsia, polyphagia and polyuria.   Hematologic/Lymphatic: Negative for bleeding problem. Does not bruise/bleed easily.   Skin:  Negative for dry skin, flushing, itching, rash and skin cancer.   Musculoskeletal:  Positive for joint pain and joint swelling. Negative for arthritis, back pain, falls, muscle cramps, muscle weakness and myalgias.   Gastrointestinal:  Negative for abdominal pain, change in bowel habit, constipation, diarrhea, dysphagia, heartburn, nausea and vomiting.   Genitourinary:  Negative for bladder incontinence, dysuria, flank pain,  "frequency and nocturia.   Neurological:  Negative for dizziness, focal weakness, headaches, light-headedness, loss of balance, numbness, paresthesias and seizures.   Psychiatric/Behavioral:  Positive for depression. Negative for memory loss and substance abuse. The patient is not nervous/anxious.    Allergic/Immunologic: Negative for environmental allergies.        PAST MEDICAL HISTORY:  Palpitations    PAST SURGICAL HISTORY:  Past Surgical History:   Procedure Laterality Date    FOOT FRACTURE SURGERY Right        SOCIAL HISTORY:  Social History     Socioeconomic History    Marital status:    Tobacco Use    Smoking status: Never    Smokeless tobacco: Never   Substance and Sexual Activity    Alcohol use: Not Currently    Drug use: Never    Sexual activity: Yes       FAMILY HISTORY:  Family History   Problem Relation Age of Onset    Heart attack Mother     Hypertension Mother     Heart attack Paternal Uncle     Heart attack Maternal Grandfather        ALLERGIES:  Allergies as of 11/28/2022    (No Known Allergies)         MEDICATIONS:  Current Outpatient Medications on File Prior to Visit   Medication Sig Dispense Refill    aspirin (ECOTRIN) 81 MG EC tablet Take 81 mg by mouth once daily.      DULoxetine (CYMBALTA) 20 MG capsule Take 20 mg by mouth 2 (two) times daily.      tamsulosin (FLOMAX) 0.4 mg Cap Take by mouth once daily.       No current facility-administered medications on file prior to visit.          PHYSICAL EXAM:  Blood pressure (!) 140/82, pulse 64, height 5' 5" (1.651 m), weight 120.2 kg (265 lb), SpO2 96 %.  Wt Readings from Last 3 Encounters:   11/28/22 120.2 kg (265 lb)   10/27/22 73.5 kg (162 lb)   04/07/22 74.4 kg (164 lb)      Body mass index is 44.1 kg/m².    Physical Exam  Vitals and nursing note reviewed.   Constitutional:       Appearance: Normal appearance. He is normal weight.   HENT:      Head: Normocephalic and atraumatic.      Right Ear: External ear normal.      Left Ear: External " ear normal.   Eyes:      General: No scleral icterus.        Right eye: No discharge.         Left eye: No discharge.      Extraocular Movements: Extraocular movements intact.      Conjunctiva/sclera: Conjunctivae normal.      Pupils: Pupils are equal, round, and reactive to light.   Cardiovascular:      Rate and Rhythm: Normal rate and regular rhythm.      Pulses: Normal pulses.      Heart sounds: Normal heart sounds. No murmur heard.    No friction rub. No gallop.   Pulmonary:      Effort: Pulmonary effort is normal.      Breath sounds: Normal breath sounds. No wheezing, rhonchi or rales.   Chest:      Chest wall: No tenderness.   Abdominal:      General: Abdomen is flat. Bowel sounds are normal. There is no distension.      Palpations: Abdomen is soft.      Tenderness: There is no abdominal tenderness. There is no guarding or rebound.   Musculoskeletal:         General: No swelling or tenderness. Normal range of motion.      Cervical back: Normal range of motion and neck supple.   Skin:     General: Skin is warm and dry.      Findings: No erythema or rash.   Neurological:      General: No focal deficit present.      Mental Status: He is alert and oriented to person, place, and time.      Cranial Nerves: No cranial nerve deficit.      Motor: No weakness.      Gait: Gait normal.   Psychiatric:         Mood and Affect: Mood normal.         Behavior: Behavior normal.         Thought Content: Thought content normal.         Judgment: Judgment normal.        LABS REVIEWED:  No results found for: WBC, RBC, HGB, HCT, MCV, MCH, MCHC, RDW, PLT, MPV, NRBC, INR  No results found for: NA, K, CL, CO2, BUN, MG, PHOS  No results found for: CPK, AST, ALT  No results found for: GLU, HGBA1C  No results found for: CHOL, HDL, LDL, TRIG, CHOLHDL    CARDIAC STUDIES REVIEWED:  EKG 3/10/22 - Afib with rapid ventricular rate.   ECHO 3/11/22 -  EF 55-60%.  Mild mitral regurgitation and mild tricuspid regurgitation   STRESS TEST 3/24/22 -    JUAN M 3/31/22 -   Predominant NSR.  Two short runs of SVT,  Very rare PACs and PVCs.   No significant pauses.  Patient trigger events with NSR and PVCs.     OTHER IMAGING STUDIES REVIEWED:    ASSESSMENT:   Paroxysmal atrial fibrillation  -     EKG 12-lead; Future    Apnea  -     Ambulatory referral/consult to Sleep Disorders; Future; Expected date: 12/05/2022        PLAN:  1. Paroxysmal A fib, back in NSR on metoprolol, heart rate not well controlled, will increase Toprol XL to 25 mg BID, continue to monitor        2. Left knee pain, reviewed  Normal echo and stress test, pt is at moderate cardiovascular risk for planned knee replacement  3. SATISH: did not complete eval, will rerefer to sleep medicine                                                                 Reval in one month, sooner if symptoms do not improve.

## 2022-12-07 ENCOUNTER — HOSPITAL ENCOUNTER (OUTPATIENT)
Dept: RADIOLOGY | Facility: HOSPITAL | Age: 62
Discharge: HOME | End: 2022-12-07
Attending: ORTHOPAEDIC SURGERY
Payer: COMMERCIAL

## 2022-12-07 DIAGNOSIS — Z01.811 PRE-OPERATIVE RESPIRATORY EXAMINATION: ICD-10-CM

## 2022-12-07 PROCEDURE — 71046 X-RAY EXAM CHEST 2 VIEWS: CPT | Mod: 26,,, | Performed by: RADIOLOGY

## 2022-12-07 PROCEDURE — 71046 X-RAY EXAM CHEST 2 VIEWS: CPT | Mod: TC

## 2022-12-07 PROCEDURE — 71046 XR CHEST PA AND LATERAL: ICD-10-PCS | Mod: 26,,, | Performed by: RADIOLOGY

## 2022-12-07 RX ORDER — METOPROLOL TARTRATE 50 MG/1
50 TABLET ORAL 2 TIMES DAILY
COMMUNITY

## 2022-12-14 ENCOUNTER — ANESTHESIA (OUTPATIENT)
Dept: SURGERY | Facility: HOSPITAL | Age: 62
End: 2022-12-14
Payer: COMMERCIAL

## 2022-12-14 ENCOUNTER — HOSPITAL ENCOUNTER (OUTPATIENT)
Facility: HOSPITAL | Age: 62
Discharge: HOME HEALTH | End: 2022-12-15
Attending: ORTHOPAEDIC SURGERY | Admitting: ORTHOPAEDIC SURGERY
Payer: COMMERCIAL

## 2022-12-14 ENCOUNTER — ANESTHESIA EVENT (OUTPATIENT)
Dept: SURGERY | Facility: HOSPITAL | Age: 62
End: 2022-12-14
Payer: COMMERCIAL

## 2022-12-14 DIAGNOSIS — I48.0 PAROXYSMAL A-FIB: ICD-10-CM

## 2022-12-14 DIAGNOSIS — M17.9 DJD (DEGENERATIVE JOINT DISEASE) OF KNEE: Primary | ICD-10-CM

## 2022-12-14 PROBLEM — N40.0 BPH (BENIGN PROSTATIC HYPERPLASIA): Status: ACTIVE | Noted: 2022-12-14

## 2022-12-14 LAB
ANION GAP SERPL CALCULATED.3IONS-SCNC: 13 MMOL/L (ref 7–16)
BASOPHILS # BLD AUTO: 0.02 K/UL (ref 0–0.2)
BASOPHILS NFR BLD AUTO: 0.2 % (ref 0–1)
BUN SERPL-MCNC: 18 MG/DL (ref 7–18)
BUN/CREAT SERPL: 13 (ref 6–20)
CALCIUM SERPL-MCNC: 8.3 MG/DL (ref 8.5–10.1)
CHLORIDE SERPL-SCNC: 105 MMOL/L (ref 98–107)
CO2 SERPL-SCNC: 28 MMOL/L (ref 21–32)
CREAT SERPL-MCNC: 1.36 MG/DL (ref 0.7–1.3)
DIFFERENTIAL METHOD BLD: ABNORMAL
EGFR (NO RACE VARIABLE) (RUSH/TITUS): 58 ML/MIN/1.73M²
EOSINOPHIL # BLD AUTO: 0.04 K/UL (ref 0–0.5)
EOSINOPHIL NFR BLD AUTO: 0.5 % (ref 1–4)
ERYTHROCYTE [DISTWIDTH] IN BLOOD BY AUTOMATED COUNT: 12.7 % (ref 11.5–14.5)
GLUCOSE SERPL-MCNC: 122 MG/DL (ref 74–106)
HCT VFR BLD AUTO: 41.8 % (ref 40–54)
HGB BLD-MCNC: 13.5 G/DL (ref 13.5–18)
IMM GRANULOCYTES # BLD AUTO: 0.04 K/UL (ref 0–0.04)
IMM GRANULOCYTES NFR BLD: 0.5 % (ref 0–0.4)
LYMPHOCYTES # BLD AUTO: 0.82 K/UL (ref 1–4.8)
LYMPHOCYTES NFR BLD AUTO: 10 % (ref 27–41)
MCH RBC QN AUTO: 30.3 PG (ref 27–31)
MCHC RBC AUTO-ENTMCNC: 32.3 G/DL (ref 32–36)
MCV RBC AUTO: 93.9 FL (ref 80–96)
MONOCYTES # BLD AUTO: 0.16 K/UL (ref 0–0.8)
MONOCYTES NFR BLD AUTO: 2 % (ref 2–6)
MPC BLD CALC-MCNC: 9 FL (ref 9.4–12.4)
NEUTROPHILS # BLD AUTO: 7.1 K/UL (ref 1.8–7.7)
NEUTROPHILS NFR BLD AUTO: 86.8 % (ref 53–65)
NRBC # BLD AUTO: 0 X10E3/UL
NRBC, AUTO (.00): 0 %
PLATELET # BLD AUTO: 151 K/UL (ref 150–400)
POTASSIUM SERPL-SCNC: 4.5 MMOL/L (ref 3.5–5.1)
RBC # BLD AUTO: 4.45 M/UL (ref 4.6–6.2)
SODIUM SERPL-SCNC: 141 MMOL/L (ref 136–145)
WBC # BLD AUTO: 8.18 K/UL (ref 4.5–11)

## 2022-12-14 PROCEDURE — D9220A PRA ANESTHESIA: ICD-10-PCS | Mod: CRNA,,, | Performed by: NURSE ANESTHETIST, CERTIFIED REGISTERED

## 2022-12-14 PROCEDURE — 36415 COLL VENOUS BLD VENIPUNCTURE: CPT | Performed by: ORTHOPAEDIC SURGERY

## 2022-12-14 PROCEDURE — 27201423 OPTIME MED/SURG SUP & DEVICES STERILE SUPPLY: Performed by: ORTHOPAEDIC SURGERY

## 2022-12-14 PROCEDURE — 37000008 HC ANESTHESIA 1ST 15 MINUTES: Performed by: ORTHOPAEDIC SURGERY

## 2022-12-14 PROCEDURE — 63600175 PHARM REV CODE 636 W HCPCS

## 2022-12-14 PROCEDURE — 97161 PT EVAL LOW COMPLEX 20 MIN: CPT

## 2022-12-14 PROCEDURE — D9220A PRA ANESTHESIA: Mod: CRNA,,, | Performed by: NURSE ANESTHETIST, CERTIFIED REGISTERED

## 2022-12-14 PROCEDURE — 94761 N-INVAS EAR/PLS OXIMETRY MLT: CPT

## 2022-12-14 PROCEDURE — D9220A PRA ANESTHESIA: ICD-10-PCS | Mod: ANES,,, | Performed by: ANESTHESIOLOGY

## 2022-12-14 PROCEDURE — 37000009 HC ANESTHESIA EA ADD 15 MINS: Performed by: ORTHOPAEDIC SURGERY

## 2022-12-14 PROCEDURE — 97165 OT EVAL LOW COMPLEX 30 MIN: CPT

## 2022-12-14 PROCEDURE — 25000003 PHARM REV CODE 250: Performed by: ANESTHESIOLOGY

## 2022-12-14 PROCEDURE — D9220A PRA ANESTHESIA: Mod: ANES,,, | Performed by: ANESTHESIOLOGY

## 2022-12-14 PROCEDURE — 64447 PERIPHERAL BLOCK: ICD-10-PCS | Mod: 59,LT,, | Performed by: ANESTHESIOLOGY

## 2022-12-14 PROCEDURE — 63600175 PHARM REV CODE 636 W HCPCS: Performed by: NURSE ANESTHETIST, CERTIFIED REGISTERED

## 2022-12-14 PROCEDURE — 64447 NJX AA&/STRD FEMORAL NRV IMG: CPT | Mod: 59,LT,, | Performed by: ANESTHESIOLOGY

## 2022-12-14 PROCEDURE — C1713 ANCHOR/SCREW BN/BN,TIS/BN: HCPCS | Performed by: ORTHOPAEDIC SURGERY

## 2022-12-14 PROCEDURE — 36000712 HC OR TIME LEV V 1ST 15 MIN: Performed by: ORTHOPAEDIC SURGERY

## 2022-12-14 PROCEDURE — 99204 PR OFFICE/OUTPT VISIT, NEW, LEVL IV, 45-59 MIN: ICD-10-PCS | Mod: ,,, | Performed by: INTERNAL MEDICINE

## 2022-12-14 PROCEDURE — C1776 JOINT DEVICE (IMPLANTABLE): HCPCS | Performed by: ORTHOPAEDIC SURGERY

## 2022-12-14 PROCEDURE — 63600175 PHARM REV CODE 636 W HCPCS: Performed by: ANESTHESIOLOGY

## 2022-12-14 PROCEDURE — 99900035 HC TECH TIME PER 15 MIN (STAT)

## 2022-12-14 PROCEDURE — 36000713 HC OR TIME LEV V EA ADD 15 MIN: Performed by: ORTHOPAEDIC SURGERY

## 2022-12-14 PROCEDURE — 71000033 HC RECOVERY, INTIAL HOUR: Performed by: ORTHOPAEDIC SURGERY

## 2022-12-14 PROCEDURE — 63600175 PHARM REV CODE 636 W HCPCS: Performed by: ORTHOPAEDIC SURGERY

## 2022-12-14 PROCEDURE — 25000003 PHARM REV CODE 250: Performed by: INTERNAL MEDICINE

## 2022-12-14 PROCEDURE — 76942 ECHO GUIDE FOR BIOPSY: CPT | Mod: 23,,, | Performed by: ANESTHESIOLOGY

## 2022-12-14 PROCEDURE — 99204 OFFICE O/P NEW MOD 45 MIN: CPT | Mod: ,,, | Performed by: INTERNAL MEDICINE

## 2022-12-14 PROCEDURE — 25000003 PHARM REV CODE 250: Performed by: ORTHOPAEDIC SURGERY

## 2022-12-14 PROCEDURE — 25000003 PHARM REV CODE 250: Performed by: NURSE ANESTHETIST, CERTIFIED REGISTERED

## 2022-12-14 PROCEDURE — 80048 BASIC METABOLIC PNL TOTAL CA: CPT | Performed by: ORTHOPAEDIC SURGERY

## 2022-12-14 PROCEDURE — 99900031 HC PATIENT EDUCATION (STAT)

## 2022-12-14 PROCEDURE — 85025 COMPLETE CBC W/AUTO DIFF WBC: CPT | Performed by: ORTHOPAEDIC SURGERY

## 2022-12-14 PROCEDURE — 76942 PR U/S GUIDANCE FOR NEEDLE GUIDANCE: ICD-10-PCS | Mod: 23,,, | Performed by: ANESTHESIOLOGY

## 2022-12-14 DEVICE — CEMENT BONE SURG SMPLX P RADPQ: Type: IMPLANTABLE DEVICE | Site: KNEE | Status: FUNCTIONAL

## 2022-12-14 DEVICE — PATELLA ATTUNE MEDLZD DOME 35: Type: IMPLANTABLE DEVICE | Site: KNEE | Status: FUNCTIONAL

## 2022-12-14 RX ORDER — MEPERIDINE HYDROCHLORIDE 25 MG/ML
25 INJECTION INTRAMUSCULAR; INTRAVENOUS; SUBCUTANEOUS EVERY 10 MIN PRN
Status: DISCONTINUED | OUTPATIENT
Start: 2022-12-14 | End: 2022-12-14 | Stop reason: HOSPADM

## 2022-12-14 RX ORDER — DULOXETIN HYDROCHLORIDE 20 MG/1
20 CAPSULE, DELAYED RELEASE ORAL 2 TIMES DAILY
Status: DISCONTINUED | OUTPATIENT
Start: 2022-12-14 | End: 2022-12-15 | Stop reason: HOSPADM

## 2022-12-14 RX ORDER — LIDOCAINE HYDROCHLORIDE 20 MG/ML
INJECTION, SOLUTION EPIDURAL; INFILTRATION; INTRACAUDAL; PERINEURAL
Status: DISCONTINUED | OUTPATIENT
Start: 2022-12-14 | End: 2022-12-14

## 2022-12-14 RX ORDER — DEXAMETHASONE SODIUM PHOSPHATE 4 MG/ML
INJECTION, SOLUTION INTRA-ARTICULAR; INTRALESIONAL; INTRAMUSCULAR; INTRAVENOUS; SOFT TISSUE
Status: DISCONTINUED | OUTPATIENT
Start: 2022-12-14 | End: 2022-12-14

## 2022-12-14 RX ORDER — CEFAZOLIN SODIUM 2 G/50ML
2 SOLUTION INTRAVENOUS
Status: COMPLETED | OUTPATIENT
Start: 2022-12-14 | End: 2022-12-15

## 2022-12-14 RX ORDER — CEFAZOLIN SODIUM 1 G/3ML
INJECTION, POWDER, FOR SOLUTION INTRAMUSCULAR; INTRAVENOUS
Status: DISCONTINUED | OUTPATIENT
Start: 2022-12-14 | End: 2022-12-14

## 2022-12-14 RX ORDER — ONDANSETRON 2 MG/ML
4 INJECTION INTRAMUSCULAR; INTRAVENOUS EVERY 8 HOURS PRN
Status: DISCONTINUED | OUTPATIENT
Start: 2022-12-14 | End: 2022-12-15 | Stop reason: HOSPADM

## 2022-12-14 RX ORDER — ROPIVACAINE HYDROCHLORIDE 7.5 MG/ML
INJECTION, SOLUTION EPIDURAL; PERINEURAL
Status: COMPLETED | OUTPATIENT
Start: 2022-12-14 | End: 2022-12-14

## 2022-12-14 RX ORDER — EPINEPHRINE 1 MG/ML
INJECTION, SOLUTION, CONCENTRATE INTRAVENOUS
Status: DISCONTINUED | OUTPATIENT
Start: 2022-12-14 | End: 2022-12-14

## 2022-12-14 RX ORDER — SODIUM CHLORIDE, SODIUM LACTATE, POTASSIUM CHLORIDE, CALCIUM CHLORIDE 600; 310; 30; 20 MG/100ML; MG/100ML; MG/100ML; MG/100ML
INJECTION, SOLUTION INTRAVENOUS CONTINUOUS
Status: DISCONTINUED | OUTPATIENT
Start: 2022-12-14 | End: 2022-12-15 | Stop reason: HOSPADM

## 2022-12-14 RX ORDER — ASPIRIN 325 MG
325 TABLET, DELAYED RELEASE (ENTERIC COATED) ORAL 2 TIMES DAILY
Refills: 0
Start: 2022-12-14 | End: 2023-12-14

## 2022-12-14 RX ORDER — TAMSULOSIN HYDROCHLORIDE 0.4 MG/1
0.4 CAPSULE ORAL DAILY
Status: DISCONTINUED | OUTPATIENT
Start: 2022-12-14 | End: 2022-12-15 | Stop reason: HOSPADM

## 2022-12-14 RX ORDER — KETOROLAC TROMETHAMINE 30 MG/ML
INJECTION, SOLUTION INTRAMUSCULAR; INTRAVENOUS
Status: DISCONTINUED | OUTPATIENT
Start: 2022-12-14 | End: 2022-12-14

## 2022-12-14 RX ORDER — MORPHINE SULFATE 10 MG/ML
4 INJECTION INTRAMUSCULAR; INTRAVENOUS; SUBCUTANEOUS EVERY 5 MIN PRN
Status: DISCONTINUED | OUTPATIENT
Start: 2022-12-14 | End: 2022-12-14 | Stop reason: HOSPADM

## 2022-12-14 RX ORDER — FENTANYL CITRATE 50 UG/ML
INJECTION, SOLUTION INTRAMUSCULAR; INTRAVENOUS
Status: DISCONTINUED | OUTPATIENT
Start: 2022-12-14 | End: 2022-12-14

## 2022-12-14 RX ORDER — SODIUM CHLORIDE, SODIUM LACTATE, POTASSIUM CHLORIDE, CALCIUM CHLORIDE 600; 310; 30; 20 MG/100ML; MG/100ML; MG/100ML; MG/100ML
125 INJECTION, SOLUTION INTRAVENOUS CONTINUOUS
Status: DISCONTINUED | OUTPATIENT
Start: 2022-12-14 | End: 2022-12-15 | Stop reason: HOSPADM

## 2022-12-14 RX ORDER — HYDROMORPHONE HYDROCHLORIDE 2 MG/ML
0.5 INJECTION, SOLUTION INTRAMUSCULAR; INTRAVENOUS; SUBCUTANEOUS EVERY 5 MIN PRN
Status: DISCONTINUED | OUTPATIENT
Start: 2022-12-14 | End: 2022-12-14 | Stop reason: HOSPADM

## 2022-12-14 RX ORDER — TRANEXAMIC ACID 100 MG/ML
INJECTION, SOLUTION INTRAVENOUS
Status: DISCONTINUED | OUTPATIENT
Start: 2022-12-14 | End: 2022-12-14

## 2022-12-14 RX ORDER — DIPHENHYDRAMINE HYDROCHLORIDE 50 MG/ML
25 INJECTION INTRAMUSCULAR; INTRAVENOUS EVERY 6 HOURS PRN
Status: DISCONTINUED | OUTPATIENT
Start: 2022-12-14 | End: 2022-12-14 | Stop reason: HOSPADM

## 2022-12-14 RX ORDER — BUPIVACAINE HYDROCHLORIDE 7.5 MG/ML
INJECTION, SOLUTION EPIDURAL; RETROBULBAR
Status: COMPLETED | OUTPATIENT
Start: 2022-12-14 | End: 2022-12-14

## 2022-12-14 RX ORDER — MORPHINE SULFATE 10 MG/ML
4 INJECTION INTRAMUSCULAR; INTRAVENOUS; SUBCUTANEOUS EVERY 4 HOURS PRN
Status: DISCONTINUED | OUTPATIENT
Start: 2022-12-14 | End: 2022-12-14

## 2022-12-14 RX ORDER — EPHEDRINE SULFATE 50 MG/ML
INJECTION, SOLUTION INTRAVENOUS
Status: DISCONTINUED | OUTPATIENT
Start: 2022-12-14 | End: 2022-12-14

## 2022-12-14 RX ORDER — MORPHINE SULFATE 1 MG/ML
INJECTION, SOLUTION EPIDURAL; INTRATHECAL; INTRAVENOUS
Status: DISCONTINUED | OUTPATIENT
Start: 2022-12-14 | End: 2022-12-14 | Stop reason: HOSPADM

## 2022-12-14 RX ORDER — OXYCODONE HYDROCHLORIDE 5 MG/1
5 TABLET ORAL
Status: DISCONTINUED | OUTPATIENT
Start: 2022-12-14 | End: 2022-12-14 | Stop reason: HOSPADM

## 2022-12-14 RX ORDER — MORPHINE SULFATE 4 MG/ML
4 INJECTION, SOLUTION INTRAMUSCULAR; INTRAVENOUS EVERY 4 HOURS PRN
Status: DISCONTINUED | OUTPATIENT
Start: 2022-12-14 | End: 2022-12-15 | Stop reason: HOSPADM

## 2022-12-14 RX ORDER — METOPROLOL TARTRATE 50 MG/1
50 TABLET ORAL 2 TIMES DAILY
Status: DISCONTINUED | OUTPATIENT
Start: 2022-12-14 | End: 2022-12-15 | Stop reason: HOSPADM

## 2022-12-14 RX ORDER — PROPOFOL 10 MG/ML
VIAL (ML) INTRAVENOUS
Status: DISCONTINUED | OUTPATIENT
Start: 2022-12-14 | End: 2022-12-14

## 2022-12-14 RX ORDER — MIDAZOLAM HYDROCHLORIDE 1 MG/ML
INJECTION INTRAMUSCULAR; INTRAVENOUS
Status: DISCONTINUED | OUTPATIENT
Start: 2022-12-14 | End: 2022-12-14

## 2022-12-14 RX ORDER — ONDANSETRON 2 MG/ML
INJECTION INTRAMUSCULAR; INTRAVENOUS
Status: DISCONTINUED | OUTPATIENT
Start: 2022-12-14 | End: 2022-12-14

## 2022-12-14 RX ORDER — ASPIRIN 325 MG
325 TABLET, DELAYED RELEASE (ENTERIC COATED) ORAL DAILY
Status: DISCONTINUED | OUTPATIENT
Start: 2022-12-15 | End: 2022-12-15 | Stop reason: HOSPADM

## 2022-12-14 RX ORDER — ONDANSETRON 2 MG/ML
4 INJECTION INTRAMUSCULAR; INTRAVENOUS DAILY PRN
Status: DISCONTINUED | OUTPATIENT
Start: 2022-12-14 | End: 2022-12-14 | Stop reason: HOSPADM

## 2022-12-14 RX ORDER — BISACODYL 10 MG
10 SUPPOSITORY, RECTAL RECTAL DAILY PRN
Status: DISCONTINUED | OUTPATIENT
Start: 2022-12-14 | End: 2022-12-15 | Stop reason: HOSPADM

## 2022-12-14 RX ORDER — BUPIVACAINE HYDROCHLORIDE 5 MG/ML
INJECTION, SOLUTION EPIDURAL; INTRACAUDAL
Status: DISCONTINUED | OUTPATIENT
Start: 2022-12-14 | End: 2022-12-14 | Stop reason: HOSPADM

## 2022-12-14 RX ORDER — OXYCODONE AND ACETAMINOPHEN 7.5; 325 MG/1; MG/1
1 TABLET ORAL EVERY 4 HOURS PRN
Qty: 20 TABLET | Refills: 0 | Status: SHIPPED | OUTPATIENT
Start: 2022-12-14 | End: 2023-06-07

## 2022-12-14 RX ORDER — PHENYLEPHRINE HYDROCHLORIDE 10 MG/ML
INJECTION INTRAVENOUS
Status: DISCONTINUED | OUTPATIENT
Start: 2022-12-14 | End: 2022-12-14

## 2022-12-14 RX ORDER — HYDROCODONE BITARTRATE AND ACETAMINOPHEN 5; 325 MG/1; MG/1
1 TABLET ORAL EVERY 4 HOURS PRN
Status: DISCONTINUED | OUTPATIENT
Start: 2022-12-14 | End: 2022-12-15 | Stop reason: HOSPADM

## 2022-12-14 RX ORDER — TOBRAMYCIN 1.2 G/30ML
INJECTION, POWDER, LYOPHILIZED, FOR SOLUTION INTRAVENOUS
Status: DISCONTINUED | OUTPATIENT
Start: 2022-12-14 | End: 2022-12-14 | Stop reason: HOSPADM

## 2022-12-14 RX ADMIN — ROPIVACAINE HYDROCHLORIDE 30 ML: 7.5 INJECTION, SOLUTION EPIDURAL; PERINEURAL at 07:12

## 2022-12-14 RX ADMIN — SODIUM CHLORIDE, POTASSIUM CHLORIDE, SODIUM LACTATE AND CALCIUM CHLORIDE: 600; 310; 30; 20 INJECTION, SOLUTION INTRAVENOUS at 07:12

## 2022-12-14 RX ADMIN — FENTANYL CITRATE 100 MCG: 50 INJECTION INTRAMUSCULAR; INTRAVENOUS at 07:12

## 2022-12-14 RX ADMIN — HYDROCODONE BITARTRATE AND ACETAMINOPHEN 1 TABLET: 5; 325 TABLET ORAL at 02:12

## 2022-12-14 RX ADMIN — LIDOCAINE HYDROCHLORIDE 25 MG: 20 INJECTION, SOLUTION INTRAVENOUS at 07:12

## 2022-12-14 RX ADMIN — BUPIVACAINE HYDROCHLORIDE 1.5 ML: 7.5 INJECTION, SOLUTION EPIDURAL; RETROBULBAR at 07:12

## 2022-12-14 RX ADMIN — EPINEPHRINE 0.01 MCG: 1 INJECTION, SOLUTION, CONCENTRATE INTRAVENOUS at 07:12

## 2022-12-14 RX ADMIN — DULOXETINE 20 MG: 20 CAPSULE, DELAYED RELEASE ORAL at 09:12

## 2022-12-14 RX ADMIN — SODIUM CHLORIDE, POTASSIUM CHLORIDE, SODIUM LACTATE AND CALCIUM CHLORIDE: 600; 310; 30; 20 INJECTION, SOLUTION INTRAVENOUS at 01:12

## 2022-12-14 RX ADMIN — SODIUM CHLORIDE, POTASSIUM CHLORIDE, SODIUM LACTATE AND CALCIUM CHLORIDE: 600; 310; 30; 20 INJECTION, SOLUTION INTRAVENOUS at 08:12

## 2022-12-14 RX ADMIN — HYDROCODONE BITARTRATE AND ACETAMINOPHEN 1 TABLET: 5; 325 TABLET ORAL at 06:12

## 2022-12-14 RX ADMIN — EPHEDRINE SULFATE 10 MG: 50 INJECTION INTRAVENOUS at 07:12

## 2022-12-14 RX ADMIN — TRANEXAMIC ACID 1000 MG: 100 INJECTION, SOLUTION INTRAVENOUS at 09:12

## 2022-12-14 RX ADMIN — CEFAZOLIN 2 G: 1 INJECTION, POWDER, FOR SOLUTION INTRAMUSCULAR; INTRAVENOUS; PARENTERAL at 07:12

## 2022-12-14 RX ADMIN — PHENYLEPHRINE HYDROCHLORIDE 100 MCG: 10 INJECTION INTRAVENOUS at 08:12

## 2022-12-14 RX ADMIN — TRANEXAMIC ACID 1000 MG: 100 INJECTION, SOLUTION INTRAVENOUS at 08:12

## 2022-12-14 RX ADMIN — PHENYLEPHRINE HYDROCHLORIDE 200 MCG: 10 INJECTION INTRAVENOUS at 09:12

## 2022-12-14 RX ADMIN — TAMSULOSIN HYDROCHLORIDE 0.4 MG: 0.4 CAPSULE ORAL at 03:12

## 2022-12-14 RX ADMIN — MIDAZOLAM 2 MG: 1 INJECTION INTRAMUSCULAR; INTRAVENOUS at 08:12

## 2022-12-14 RX ADMIN — VANCOMYCIN HYDROCHLORIDE 1000 MG: 1 INJECTION, POWDER, LYOPHILIZED, FOR SOLUTION INTRAVENOUS at 10:12

## 2022-12-14 RX ADMIN — PHENYLEPHRINE HYDROCHLORIDE 100 MCG: 10 INJECTION INTRAVENOUS at 09:12

## 2022-12-14 RX ADMIN — PROPOFOL 50 MG: 10 INJECTION, EMULSION INTRAVENOUS at 07:12

## 2022-12-14 RX ADMIN — ONDANSETRON 4 MG: 2 INJECTION INTRAMUSCULAR; INTRAVENOUS at 09:12

## 2022-12-14 RX ADMIN — MORPHINE SULFATE 4 MG: 4 INJECTION, SOLUTION INTRAMUSCULAR; INTRAVENOUS at 10:12

## 2022-12-14 RX ADMIN — DEXTROSE MONOHYDRATE 2 G: 50 INJECTION, SOLUTION INTRAVENOUS at 03:12

## 2022-12-14 RX ADMIN — VANCOMYCIN HYDROCHLORIDE 1250 MG: 1 INJECTION, POWDER, LYOPHILIZED, FOR SOLUTION INTRAVENOUS at 07:12

## 2022-12-14 RX ADMIN — EPHEDRINE SULFATE 20 MG: 50 INJECTION INTRAVENOUS at 08:12

## 2022-12-14 RX ADMIN — KETOROLAC TROMETHAMINE 60 MG: 30 INJECTION, SOLUTION INTRAMUSCULAR at 07:12

## 2022-12-14 RX ADMIN — DEXAMETHASONE SODIUM PHOSPHATE 10 MG: 4 INJECTION, SOLUTION INTRA-ARTICULAR; INTRALESIONAL; INTRAMUSCULAR; INTRAVENOUS; SOFT TISSUE at 07:12

## 2022-12-14 RX ADMIN — METOPROLOL TARTRATE 50 MG: 50 TABLET, FILM COATED ORAL at 09:12

## 2022-12-14 NOTE — PLAN OF CARE
Problem: Occupational Therapy  Goal: Occupational Therapy Goal  Description: ST.Pt will perform bathing with Domonique with setup at EOB  2.Pt will perform UE dressing with Judith  3.Pt will perform LE dressing with Domonique with AD if needed  4.Pt will transfer bed/chair/bsc with SBA with RW  5.Pt will perform standing task x 2 min with SBA with RW  6.Tolerate 15 min of tx without fatigue.      LTG:   Restore to max I with selfcare and mobility.      Outcome: Ongoing, Progressing

## 2022-12-14 NOTE — PT/OT/SLP EVAL
Physical Therapy Evaluation      Patient Name: Roberto Lara   MRN: [id number]  Recent Surgery: Procedure(s) (LRB):  ARTHROPLASTY, KNEE, TOTAL, USING COMPUTER-ASSISTED NAVIGATION (Left) Day of Surgery    Recommendations:     Discharge Recommendations: home health PT   Discharge Equipment Recommendations: bedside commode, walker, rolling   Barriers to discharge: None    Assessment:     Roberto Lara is a 62 y.o. male admitted with a medical diagnosis of DJD (degenerative joint disease) of knee. He presents with the following impairments/functional limitations: weakness, impaired functional mobility, gait instability, decreased lower extremity function, pain, decreased ROM. Pt demonstrates good mobility s/p total knee replacement. Pt required minimal assistance with bed moblity and was able to ambulate short distance with rolling walker. Pt to begin ROM in morning.     Rehab Prognosis: Good; patient would benefit from acute skilled PT services to address these deficits and reach maximum level of function.  Recent Surgery: Procedure(s) (LRB):  ARTHROPLASTY, KNEE, TOTAL, USING COMPUTER-ASSISTED NAVIGATION (Left) Day of Surgery    Plan:     During this hospitalization, patient to be seen BID (5x/wk, daily 2x/wk) to address the identified rehab impairments via gait training, therapeutic activities, therapeutic exercises and progress toward the following goals:    Plan of Care Expires: 01/14/23    Subjective     Chief Complaint: left total knee replacement   Patient/Family Comments/Goals: Pt agreeable to PT eval  Pain/Comfort:  Pain Rating 1: 4/10  Location - Side 1: Left  Location 1: knee  Pain Addressed 1: Pre-medicate for activity  Pain Rating Post-Intervention 1: 3/10    Patients cultural, spiritual, Sabianist conflicts given the current situation: no    Social History:  Living Environment: Patient lives with their spouse in a single story home, number of outside stairs: 0 .  Prior Level of Function: Prior to admission,  patient was independent with ADLs.  Equipment Used at Home: none  DME owned (not currently used): none  Assistance Upon Discharge: family    Objective:     Communicated with A Johnathan RN prior to session. Patient found HOB elevated with blood pressure cuff, hemovac, knee immobilizer, cryotherapy, peripheral IV, SCD, pulse ox (continuous) upon PT entry to room.    General Precautions: Standard, fall   Orthopedic Precautions:LLE weight bearing as tolerated   Braces: Knee immobilizer   Respiratory Status: Room air    Exams:  RLE ROM: WFL  RLE Strength: WFL  LLE ROM:  in knee immobilizer  LLE Strength:  impaired post op  Cognitive Exam: Patient is oriented to Person, Place, Time, Situation      Functional Mobility:  Bed Mobility:   Scooting: contact guard assistance  Supine to Sit: minimum assistance  Transfers:  Sit to Stand: minimum assistance with rolling walker with cues for hand placement  Bed to Chair: contact guard assistance with rolling walker using Step Transfer  Gait:   Patient ambulated 30 with rolling walker and contact guard assistance.   Balance:   Sitting: NORMAL: No deviations seen in posture held dynamically  Standing: GOOD: Needs SUPERVISION only during gait and able to self right with moderate LOB      Therapeutic Activities and Exercises:  Patient educated on role of acute care PT and PT POC, safety while in hospital including calling nurse for mobility, and call light usage.      AM-PAC 6 CLICK MOBILITY  Total Score:18     Patient left up in chair with all lines intact and call button in reach.    GOALS:   Multidisciplinary Problems       Physical Therapy Goals          Problem: Physical Therapy    Goal Priority Disciplines Outcome Goal Variances Interventions   Physical Therapy Goal     PT, PT/OT Ongoing, Progressing     Description: Short term goals:  Pt will perform supine to sit with contact guard assistance  Pt will perform sit to stand with contact guard assistance  Pt will transfer from bed  to chair with contact guard assistance using rolling walker  Pt will demonstrate left knee flexion range of motion from 0 to 90 degrees  Pt will ambulate 100 ft with contact guard assistanceusing rolling walker    Long term goals:  Pt will perform supine to sit with modified independence  Pt will perform sit to stand with modified independence  Pt will transfer from bed to chair with modified independence using rolling walker  Pt will ambulate 300 ft with modified independence using rolling walker                         History:     Past Medical History:   Diagnosis Date    Paroxysmal atrial fibrillation        Past Surgical History:   Procedure Laterality Date    cyst wrist Left     FOOT FRACTURE SURGERY Right     URINARY SURGERY         Time Tracking:     PT Received On: 12/14/22  PT Start Time: 1430  PT Stop Time: 1449  PT Total Time (min): 19 min     Billable Minutes: Evaluation low complexity    12/14/2022

## 2022-12-14 NOTE — ANESTHESIA POSTPROCEDURE EVALUATION
Anesthesia Post Evaluation    Patient: Roberto Lara    Procedure(s) Performed: Procedure(s) (LRB):  ARTHROPLASTY, KNEE, TOTAL, USING COMPUTER-ASSISTED NAVIGATION (Left)    Final Anesthesia Type: general      Patient location during evaluation: PACU  Patient participation: Yes- Able to Participate  Level of consciousness: awake and sedated  Post-procedure vital signs: reviewed and stable  Pain management: adequate  Airway patency: patent    PONV status at discharge: No PONV  Anesthetic complications: no      Cardiovascular status: blood pressure returned to baseline  Respiratory status: unassisted  Hydration status: euvolemic  Follow-up not needed.          Vitals Value Taken Time   /74 12/14/22 1200   Temp 36.5 °C (97.7 °F) 12/14/22 1200   Pulse 76 12/14/22 1200   Resp 16 12/14/22 1200   SpO2 97 % 12/14/22 1200         Event Time   Out of Recovery 10:40:00         Pain/Quirino Score: Quirino Score: 10 (12/14/2022 10:35 AM)

## 2022-12-14 NOTE — OR NURSING
1003 REC'D TO PACU IN STABLE COND. PT AWAKE & ALERT. CONNECTED TO BP CUFF, EKG LEADS & SPO2 MONITORS. VS STABLE. PT HAD A TOTAL LT KNEE REPLACEMENT. NO DISTRESS NOTED @ THIS TIME, WILL CONT TO MONITOR.    1035 TRANSFERRED TO ROOM 467 IN STABLE [place]D. ASSISTED ONTO HOSPITAL BED. VS STABLE. BEDS SIDE REPORT GIVEN TO FILIBERTO ACKERMAN. NO DISTRESS NOTED @ THIS TIME. VS 96 % 104  98.3  132/64

## 2022-12-14 NOTE — SUBJECTIVE & OBJECTIVE
Past Medical History:   Diagnosis Date    Paroxysmal atrial fibrillation        Past Surgical History:   Procedure Laterality Date    cyst wrist Left     FOOT FRACTURE SURGERY Right     URINARY SURGERY         Review of patient's allergies indicates:  No Known Allergies    No current facility-administered medications on file prior to encounter.     Current Outpatient Medications on File Prior to Encounter   Medication Sig    DULoxetine (CYMBALTA) 20 MG capsule Take 20 mg by mouth 2 (two) times daily.    metoprolol tartrate (LOPRESSOR) 50 MG tablet Take 50 mg by mouth 2 (two) times daily.    tamsulosin (FLOMAX) 0.4 mg Cap Take by mouth once daily.    [DISCONTINUED] aspirin (ECOTRIN) 81 MG EC tablet Take 81 mg by mouth once daily.     Family History       Problem Relation (Age of Onset)    Heart attack Mother, Paternal Uncle, Maternal Grandfather    Hypertension Mother          Tobacco Use    Smoking status: Never    Smokeless tobacco: Never   Substance and Sexual Activity    Alcohol use: Not Currently    Drug use: Never    Sexual activity: Yes     Review of Systems   Constitutional: Negative.  Negative for chills, fatigue and fever.   HENT: Negative.  Negative for congestion and rhinorrhea.    Respiratory:  Negative for apnea, cough, chest tightness and shortness of breath.    Cardiovascular: Negative.  Negative for chest pain and leg swelling.   Gastrointestinal: Negative.  Negative for abdominal pain, diarrhea, nausea and vomiting.   Endocrine: Negative.    Genitourinary: Negative.    Musculoskeletal:  Positive for arthralgias.   Skin: Negative.    Allergic/Immunologic: Negative.    Neurological: Negative.    Hematological: Negative.    Psychiatric/Behavioral: Negative.     Objective:     Vital Signs (Most Recent):  Temp: 97.7 °F (36.5 °C) (12/14/22 1200)  Pulse: 76 (12/14/22 1400)  Resp: 18 (12/14/22 1403)  BP: 129/63 (12/14/22 1400)  SpO2: 95 % (12/14/22 1400) Vital Signs (24h Range):  Temp:  [97.7 °F (36.5  °C)-98.9 °F (37.2 °C)] 97.7 °F (36.5 °C)  Pulse:  [65-81] 76  Resp:  [8-18] 18  SpO2:  [93 %-98 %] 95 %  BP: (111-168)/(51-90) 129/63     Weight: 72.6 kg (160 lb)  Body mass index is 26.63 kg/m².    Physical Exam  Vitals reviewed.   Constitutional:       Appearance: Normal appearance.   HENT:      Head: Normocephalic and atraumatic.      Nose: Nose normal. No congestion.      Mouth/Throat:      Pharynx: Oropharynx is clear.   Eyes:      Extraocular Movements: Extraocular movements intact.      Conjunctiva/sclera: Conjunctivae normal.      Pupils: Pupils are equal, round, and reactive to light.   Cardiovascular:      Rate and Rhythm: Normal rate and regular rhythm.      Pulses: Normal pulses.      Heart sounds: Normal heart sounds.   Pulmonary:      Effort: Pulmonary effort is normal. No respiratory distress.      Breath sounds: Normal breath sounds. No wheezing.   Abdominal:      General: Bowel sounds are normal. There is no distension.      Palpations: Abdomen is soft.      Tenderness: There is no abdominal tenderness.   Musculoskeletal:         General: Tenderness present.   Lymphadenopathy:      Cervical: No cervical adenopathy.   Skin:     General: Skin is warm.   Neurological:      General: No focal deficit present.      Mental Status: He is alert. Mental status is at baseline.   Psychiatric:         Mood and Affect: Mood normal.         CRANIAL NERVES     CN III, IV, VI   Pupils are equal, round, and reactive to light.     Significant Labs: All pertinent labs within the past 24 hours have been reviewed.    Significant Imaging: I have reviewed all pertinent imaging results/findings within the past 24 hours.

## 2022-12-14 NOTE — PLAN OF CARE
Problem: Adult Inpatient Plan of Care  Goal: Plan of Care Review  12/14/2022 1653 by Aimee Mann RN  Outcome: Ongoing, Progressing  12/14/2022 1355 by Aimee Mann RN  Outcome: Ongoing, Progressing  Goal: Patient-Specific Goal (Individualized)  12/14/2022 1653 by Aimee Mann RN  Outcome: Ongoing, Progressing  12/14/2022 1355 by Aimee Mann RN  Outcome: Ongoing, Progressing  Goal: Absence of Hospital-Acquired Illness or Injury  12/14/2022 1653 by Aimee Mann RN  Outcome: Ongoing, Progressing  12/14/2022 1355 by Aimee Mann RN  Outcome: Ongoing, Progressing  Goal: Optimal Comfort and Wellbeing  12/14/2022 1653 by Aimee Mann RN  Outcome: Ongoing, Progressing  12/14/2022 1355 by Aimee Mann RN  Outcome: Ongoing, Progressing  Goal: Readiness for Transition of Care  12/14/2022 1653 by Aimee Mann RN  Outcome: Ongoing, Progressing  12/14/2022 1355 by Aimee Mann RN  Outcome: Ongoing, Progressing     Problem: Bariatric Environmental Safety  Goal: Safety Maintained with Care  12/14/2022 1653 by Aimee Mann RN  Outcome: Ongoing, Progressing  12/14/2022 1355 by Aimee Mann RN  Outcome: Ongoing, Progressing     Problem: Infection  Goal: Absence of Infection Signs and Symptoms  12/14/2022 1653 by Aimee Mann RN  Outcome: Ongoing, Progressing  12/14/2022 1355 by Aimee Mann RN  Outcome: Ongoing, Progressing

## 2022-12-14 NOTE — PT/OT/SLP EVAL
Occupational Therapy Evaluation      Name: Roberto Lara  MRN: [id number]  Admitting Diagnosis: DJD (degenerative joint disease) of knee Day of Surgery  Recent Surgery: Procedure(s) (LRB):  ARTHROPLASTY, KNEE, TOTAL, USING COMPUTER-ASSISTED NAVIGATION (Left) Day of Surgery    Recommendations:     Discharge Recommendations: home with home health  Level of Assistance Recommended: Intermittent assistance   Discharge Equipment Recommendations: bedside commode, walker, rolling  Barriers to discharge: None    Assessment:   Roberto Lara is a 62 y.o. male with a medical diagnosis of DJD (degenerative joint disease) of knee. He presents with performance deficits affecting function including impaired self care skills, impaired functional mobility, gait instability, impaired balance, pain, orthopedic precautions. Patient tolerated session good. Pt s/p left TKR on 12/14/22.    Rehab Prognosis: Good; patient would benefit from acute skilled OT services to address these deficits and reach maximum level of function.    Plan:     Patient to be seen 5 x/week to address the above listed problems via self-care/home management, therapeutic activities, therapeutic exercises  Plan of Care Expires: 12/21/22  Plan of Care Reviewed with: patient    Subjective     Chief Complaint: s/p left TKR  Patient Comments/Goals: pt agreeable to participate in OT eval  Pain/Comfort:  Pain Rating 1: 3/10  Location - Side 1: Left  Location 1: knee  Pain Addressed 1: Pre-medicate for activity    Patients cultural, spiritual, [employer] conflicts given the current situation: no    Social History:  Living Environment: Patient lives with their spouse in a single story home, number of outside stairs: 0 steps to enter; 1 step inside .  Prior Level of Function: Prior to admission, patient was independent with ADLs, using no AD for ambulation.  Roles and Routines: perform self care  Equipment Used at Home:none  DME owned (not currently used): none  Upon discharge,  patient will have assistance from family.    Objective:     Communicated with TYRON Artis prior to session. Patient found HOB elevated with blood pressure cuff, hemovac, knee immobilizer, cryotherapy, peripheral IV, SCD, pulse ox (continuous) upon OT entry to room.    General Precautions: Standard, fall   Orthopedic Precautions:LLE weight bearing as tolerated   Braces: Knee immobilizer   Respiratory Status: Room air    Occupational Performance    Bed Mobility:  Supine to sit from left side of bed with minimum assistance    Functional Mobility/Transfers:  Sit <> Stand Transfer with minimum assistance with rolling walker  Bed <> Chair Transfer using Step Transfer technique with contact guard assistance with rolling walker  Functional Mobility: pt performed functional mobility with RW with CGA    Activities of Daily Living:  Upper Body Dressing: minimum assistance to shaneka gown as robe    Cognitive/Visual Perceptual:  Cognitive/Psychosocial Skills:     -       Oriented to: Person, Place, Time, and Situation   -       Follows Commands/attention:Follows multistep  commands  -       Mood/Affect/Coping skills/emotional control: Cooperative    Physical Exam:  Balance:    -       WFL with RW  Upper Extremity Range of Motion:     -       Right Upper Extremity: WFL  -       Left Upper Extremity: WFL  Upper Extremity Strength:    -       Right Upper Extremity: WFL  -       Left Upper Extremity: WFL  Gross motor coordination:   WFL    AMPAC 6 Click ADL:  AMPAC Total Score:      Treatment & Education:  Educated on the Importance of mobility to maximize recovery.  Educated on the importance of OOB mobility within safe range in order to decrease adverse effects of prolonged bedrest.  Educated on safety with functional mobility; hand placement to ensure safe transfers to various surfaces in prep for ADLs  Educated on performing functional mobility and ADL's in adherence to orthopedic precautions.  Educated on weight bearing status  Will  continue to educate as needed        Patient clear to ambulate to/from bathroom with RN/PCT, assist x1 .    Patient left up in chair with all lines intact, call button in reach, RN notified, and spouse present.    GOALS:   Multidisciplinary Problems       Occupational Therapy Goals          Problem: Occupational Therapy    Goal Priority Disciplines Outcome Interventions   Occupational Therapy Goal     OT, PT/OT Ongoing, Progressing    Description: ST.Pt will perform bathing with Domonique with setup at EOB  2.Pt will perform UE dressing with Judith  3.Pt will perform LE dressing with Domonique with AD if needed  4.Pt will transfer bed/chair/bsc with SBA with RW  5.Pt will perform standing task x 2 min with SBA with RW  6.Tolerate 15 min of tx without fatigue.      LTG:   Restore to max I with selfcare and mobility.                           History:     Past Medical History:   Diagnosis Date    Paroxysmal atrial fibrillation          Past Surgical History:   Procedure Laterality Date    cyst wrist Left     FOOT FRACTURE SURGERY Right     URINARY SURGERY         Time Tracking:     OT Date of Treatment: 22  OT Start Time: 1431  OT Stop Time: 1448  OT Total Time (min): 17 min    Billable Minutes: Evaluation OT min complexity eval    2022

## 2022-12-14 NOTE — ANESTHESIA PROCEDURE NOTES
Peripheral Block    Patient location during procedure: OR   Block not for primary anesthetic.  Reason for block: at surgeon's request and post-op pain management   Post-op Pain Location: s/p lt tka, p op pain   Start time: 12/14/2022 7:34 AM  Timeout: 12/14/2022 7:33 AM   End time: 12/14/2022 7:40 AM    Staffing  Authorizing Provider: Jitendra Joshua MD  Performing Provider: Jitendra Joshua MD    Preanesthetic Checklist  Completed: patient identified, IV checked, site marked, risks and benefits discussed, surgical consent, monitors and equipment checked, pre-op evaluation and timeout performed  Peripheral Block  Patient position: supine  Prep: ChloraPrep  Patient monitoring: heart rate, cardiac monitor, continuous pulse ox, continuous capnometry and frequent blood pressure checks  Block type: adductor canal  Laterality: left  Injection technique: single shot  Needle  Needle type: Stimuplex   Needle gauge: 20 G  Needle length: 4 in  Needle localization: ultrasound guidance   -ultrasound image captured on disc.  Assessment  Injection assessment: negative aspiration, negative parasthesia and local visualized surrounding nerve  Paresthesia pain: none  Heart rate change: no  Slow fractionated injection: yes  Pain Tolerance: comfortable throughout block and no complaints  Medications:    Medications: ROPIvacaine (NAROPIN) injection 0.75% - Perineural   30 mL - 12/14/2022 7:34:00 AM

## 2022-12-14 NOTE — ANESTHESIA PROCEDURE NOTES
Intubation    Date/Time: 12/14/2022 7:35 AM  Performed by: Megan Silva CRNA  Authorized by: Jitendra Joshua MD     Intubation:     Induction:  Intravenous    Intubated:  Postinduction    Mask Ventilation:  Not attempted    Attempts:  1    Attempted By:  CRNA    Difficult Airway Encountered?: No      Complications:  None    Airway Device:  Supraglottic airway/LMA    Airway Device Size:  4.0    Style/Cuff Inflation:  Cuffed    Inflation Amount (mL):  10    Secured at:  The lips    Placement Verified By:  Capnometry    Complicating Factors:  None    Findings Post-Intubation:  BS equal bilateral and atraumatic/condition of teeth unchanged

## 2022-12-14 NOTE — H&P
[facility]  [facility]  History & Physical    Patient Name: Roberto Lara  MRN: [id number]  Patient Class: OP- Hospital Outpatient Surgery  Admission Date: 12/14/2022  Attending Physician: Ron Fitzptarick III, MD   Primary Care Provider: Pippa Portillo DO         Patient information was obtained from patient, spouse/SO and ER records.     Subjective:     Principal Problem:DJD (degenerative joint disease) of knee    Chief Complaint: No chief complaint on file.       HPI: Patient is a 62-year-old male with past medical history of chronic pain syndrome, benign prostatic hyperplasia, paroxysmal AFib, osteoarthritis sp left Total Knee Arthroplasty , medical team consulted for medical management.  Patient is seen postprocedure, he is currently working with physical therapist and walking, he seems to be comfortable walking, does complain of some pain, however sees it is bearable.  States he just got pain medication and seems to have helped him.  He denies having any chest pain shortness of breath fever chills nausea vomiting or diarrhea.  Patient's spouse is at bedside, states that patient was recently diagnosed with paroxysmal AFib, however was never started on full-dose anticoagulation likely because of chads Vasc being 0.  Patient sees Dr. Peraza in Cardiology, had seen him recently just prior to the surgery.      Past Medical History:   Diagnosis Date    Paroxysmal atrial fibrillation        Past Surgical History:   Procedure Laterality Date    cyst wrist Left     FOOT FRACTURE SURGERY Right     URINARY SURGERY         Review of patient's allergies indicates:  No Known Allergies    No current facility-administered medications on file prior to encounter.     Current Outpatient Medications on File Prior to Encounter   Medication Sig    DULoxetine (CYMBALTA) 20 MG capsule Take 20 mg by mouth 2 (two) times daily.    metoprolol tartrate (LOPRESSOR) 50 MG tablet Take 50 mg by mouth 2 (two)  times daily.    tamsulosin (FLOMAX) 0.4 mg Cap Take by mouth once daily.    [DISCONTINUED] aspirin (ECOTRIN) 81 MG EC tablet Take 81 mg by mouth once daily.     Family History       Problem Relation (Age of Onset)    Heart attack Mother, Paternal Uncle, Maternal Grandfather    Hypertension Mother          Tobacco Use    Smoking status: Never    Smokeless tobacco: Never   Substance and Sexual Activity    Alcohol use: Not Currently    Drug use: Never    Sexual activity: Yes     Review of Systems   Constitutional: Negative.  Negative for chills, fatigue and fever.   HENT: Negative.  Negative for congestion and rhinorrhea.    Respiratory:  Negative for apnea, cough, chest tightness and shortness of breath.    Cardiovascular: Negative.  Negative for chest pain and leg swelling.   Gastrointestinal: Negative.  Negative for abdominal pain, diarrhea, nausea and vomiting.   Endocrine: Negative.    Genitourinary: Negative.    Musculoskeletal:  Positive for arthralgias.   Skin: Negative.    Allergic/Immunologic: Negative.    Neurological: Negative.    Hematological: Negative.    Psychiatric/Behavioral: Negative.     Objective:     Vital Signs (Most Recent):  Temp: 97.7 °F (36.5 °C) (12/14/22 1200)  Pulse: 76 (12/14/22 1400)  Resp: 18 (12/14/22 1403)  BP: 129/63 (12/14/22 1400)  SpO2: 95 % (12/14/22 1400) Vital Signs (24h Range):  Temp:  [97.7 °F (36.5 °C)-98.9 °F (37.2 °C)] 97.7 °F (36.5 °C)  Pulse:  [65-81] 76  Resp:  [8-18] 18  SpO2:  [93 %-98 %] 95 %  BP: (111-168)/(51-90) 129/63     Weight: 72.6 kg (160 lb)  Body mass index is 26.63 kg/m².    Physical Exam  Vitals reviewed.   Constitutional:       Appearance: Normal appearance.   HENT:      Head: Normocephalic and atraumatic.      Nose: Nose normal. No congestion.      Mouth/Throat:      Pharynx: Oropharynx is clear.   Eyes:      Extraocular Movements: Extraocular movements intact.      Conjunctiva/sclera: Conjunctivae normal.      Pupils: Pupils are equal, round,  and reactive to light.   Cardiovascular:      Rate and Rhythm: Normal rate and regular rhythm.      Pulses: Normal pulses.      Heart sounds: Normal heart sounds.   Pulmonary:      Effort: Pulmonary effort is normal. No respiratory distress.      Breath sounds: Normal breath sounds. No wheezing.   Abdominal:      General: Bowel sounds are normal. There is no distension.      Palpations: Abdomen is soft.      Tenderness: There is no abdominal tenderness.   Musculoskeletal:         General: Tenderness present.   Lymphadenopathy:      Cervical: No cervical adenopathy.   Skin:     General: Skin is warm.   Neurological:      General: No focal deficit present.      Mental Status: He is alert. Mental status is at baseline.   Psychiatric:         Mood and Affect: Mood normal.         CRANIAL NERVES     CN III, IV, VI   Pupils are equal, round, and reactive to light.     Significant Labs: All pertinent labs within the past 24 hours have been reviewed.    Significant Imaging: I have reviewed all pertinent imaging results/findings within the past 24 hours.    Assessment/Plan:     * DJD (degenerative joint disease) of knee  Primary team managing, defer pain management and DVT prophylaxis to primary team.      BPH (benign prostatic hyperplasia)  tamsulosin      Chronic pain of left knee  Prn pain meds      Paroxysmal atrial fibrillation  Patient with Paroxysmal (<7 days) atrial fibrillation which is controlled currently with Beta Blocker. Patient is currently in sinus rhythm.[id number] Score 0: The patient doesn't have any registry metric data available. . Anticoagulation not indicated due to likely 0 chadvasc. per them cardiology never started him on it saying he doesnt need it. outpatient cardiology follow up. .          VTE Risk Mitigation (From admission, onward)         Ordered     IP VTE HIGH RISK PATIENT  Once         12/14/22 1011     Place MELISSA hose  Until discontinued         12/14/22 1011                   Rehmat U  MD Sarabjit  Department of Hospital Medicine   [facility]

## 2022-12-14 NOTE — OP NOTE
Department of Orthopedic Surgery    Operative Note  NAME: Roberto Lara    : 1960    SURGERY DATE: 2022     PREOPERATIVE DIAGNOSIS:  Left knee end-stage DJD    POSTOPERATIVE DIAGNOSIS:  Same    PROCEDURE: left Total Knee Arthroplasty computer navigated  Three femur posterior stabilized  5 tibial tray  7 poly posterior stabilized    SURGEON: Ron Fitzpatrick III, MD    ASSISTANT:  Nadeem    ANESTHESIA:  Spinal general adductor canal block    BLOOD LOSS:  100 cc    TOURNIQUET TIME:  See record less than 70 minutes    DRAINS: Hemovac drain x2    IMPLANT:  Implant Name Type Inv. Item Serial No.  Lot No. LRB No. Used Action   CEMENT BONE SURG SMPLX P RADPQ - [id number]  CEMENT BONE SURG SMPLX P RADPQ  [employer]. [id number] Left 1 Implanted   CEMENT BONE SURG SMPLX P RADPQ - [id number]  CEMENT BONE SURG SMPLX P RADPQ  [employer]. [id number] Left 1 Implanted   SYS KNEE EPAK PIN ATTUNE - [id number]  SYS KNEE EPAK PIN ATTUNE  [employer] [id number] Left 1 Implanted and Explanted   DEPUY ATTUNE FEMORAL POSTERIOR STABILIZED SIZE 3 LEFT      [id number] Left 1 Implanted   PATELLA ATTUNE MEDLZD DOME 35 - [id number]  PATELLA ATTUNE MEDLZD DOME 35  [employer]. [id number] Left 1 Implanted   DEPUY ATTUNE TIBIAL BASE FIXED BEARING SIZE 5 CEMENTED      [id number] Left 1 Implanted   DEPUY TIBIAL INSERT SIZE 3 / 7MM      [id number] Left 1 Implanted   PIN SCHANZ [id number] - [id number]  PIN SCHANZ [id number]  [employer]  Left 2 Implanted and Explanted   PIN SCHANZ [id number] - [id number]  PIN SCHANZ [id number]  [employer]  Left 1 Implanted and Explanted         INDICATIONS FOR PROCEDURE:   [unfilled] is a 62 y.o.-year-old with debilitating left-sided knee pain despite nonoperative measures and interested in knee arthroplasty.    PROCEDURE IN DETAIL:  After obtaining informed consent and starting the patient on preoperative IV antibiotics, the patient was taken back to the Operating   Room. Anesthesia was performed by Anesthesia  Team. The left lower  extremity was prepped and draped in normal sterile fashion.  An Esmarch bandage was used to exsanguinate the affected lower extremity and the pneumatic tourniquet  was inflated to 300 mmHg.  A standard longitudinal midline incision was made beginning 3 fingerbreadths above the superior pole of the patella extending down to the tibial tubercle.  Full-thickness skin flaps were raised.  A medial parapatellar arthrotomy was made. The patella was then everted.    A release of the proximal medial tibia and MCL was then undertaken.  The infrapatella fat pad was resected. Two pins were placed in the femur and two pins in the tibia for computer navigation.  The leg was placed through range of motion to get the overall alignment of the femoral head.  Landmarks on the femur and the tibia were then marked.  Using computer navigation 9 mm were resected from the femur with 5 degrees of valgus.  The cut was verified using the computer array.  The size of the femoral prosthesis was re-verified mechanically in addition to the computer verification system.  Chamfer cuts were then made with 3 degrees of external rotation for a size 3 femoral component.  The notch cuts for posterior stabilization were then made.  A trial reduction was performed using the femoral trials.  Attention was then turned to the tibial side.  Once again using computer navigation, a resection of approximately 9 mm was performed with approximately a 3-degree posterior slope.  The mechanical trials were used at this point to measure the appropriate tibial baseplate, which was 5.  The outrigger alecia was then used for rotational verification.  Once the rotation was confirmed with the outrigger alecia, the tibial plateau was drilled then impacted for the stem.  With the trial tibial baseplate and the femoral component in place, the poly was trialed for overall extension, flexion, and balancing.  The best fit poly was 7.  Attention was then turned to  the patella which with the guide set at 9 mm, the cut was made.  It was mechanically sized to a size 35 and the three PEG holes drilled.  A repeat trial reduction once again was performed, verifying the patellar stability, obtaining full extension without hyperextension and good flexion.  The knee was then injected with 10 ccs of Marcaine and 10 ccs of Duramorph in the synovial tissue.  Hemostasis was also obtained being careful to check posterior, medial and lateral.  The knee was copiously irrigated out.  At this time, two bags of surgical Simplex P cement were mixed with 1.2 of tobramycin using third generation vacuum .  Once the cement was thick,  a cement gun was used to inject the tibia and the stem on the baseplate.  Then the tibial baseplate was impacted in place.  Excess cement was removed.  The femoral component was then impacted with extra cement removed.  Trial poly was placed. The leg was placed in full extension with pressure on the leg to maintain extension.  Any excess cement was once again removed at this point.  The patella was then cemented in place and clamped.  Once all the cement had hardened, the trial poly was removed and any excess cement was removed, being careful to check under the medial and lateral femoral condyles for any excess cement.  The knee was copiously irrigated out.  The tourniquet was then released and hemostasis was obtained.  The knee was irrigated out and a posterior stabilized polyethelene was impacted onto the tibial baseplate. The knee was once again placed through range of motion, verifying full extension, stability to varus and valgus stressing and flexion.  Hemostasis once again obtained using Bovie.  The knee was irrigated out, drains were placed x 2 using medium Hemovac.  The capsule was closed with #2 Ti-Cron.  Then 0 and 2-0 Vicryl were used to close the subcutaneous tissue.  The skin was closed with staples.  A silver dressing was applied to the  incision with a bulky sterile dressing around the knee and a compressive dressing from the foot to the thigh.  A cryo-cuff and a knee immobilizer were applied over the dressing.  The patient tolerated the procedure well without complications.           Ron Fitzpatrick III, MD

## 2022-12-14 NOTE — ANESTHESIA PREPROCEDURE EVALUATION
12/14/2022  Roberto Lara is a 62 y.o., male.      Pre-op Assessment    I have reviewed the Patient Summary Reports.     I have reviewed the Nursing Notes. I have reviewed the NPO Status.   I have reviewed the Medications.     Review of Systems  Anesthesia Hx:  No problems with previous Anesthesia    Social:  Non-Smoker, No Alcohol Use    Hematology/Oncology:  Hematology Normal   Oncology Normal     EENT/Dental:EENT/Dental Normal   Cardiovascular:   Dysrhythmias atrial fibrillation    Pulmonary:   Sleep Apnea    Renal/:  Renal/ Normal     Hepatic/GI:  Hepatic/GI Normal    Musculoskeletal:  Musculoskeletal Normal    Neurological:  Neurology Normal    Endocrine:  Endocrine Normal    Dermatological:  Skin Normal    Psych:  Psychiatric Normal           Physical Exam  General: Well nourished    Airway:  Mallampati: II / II  Mouth Opening: Normal  TM Distance: > 6 cm  Tongue: Normal  Neck ROM: Normal ROM    Dental:  Loose teeth  Loose upper dental work  Chest/Lungs:  Clear to auscultation, Normal Respiratory Rate    Heart:  Rate: Normal  Rhythm: Regular Rhythm        Anesthesia Plan  Type of Anesthesia, risks & benefits discussed:    Anesthesia Type: Gen Supraglottic Airway, Spinal, Regional  Intra-op Monitoring Plan: Standard ASA Monitors  Post Op Pain Control Plan: multimodal analgesia  Induction:  IV  Informed Consent: Informed consent signed with the Patient and all parties understand the risks and agree with anesthesia plan.  All questions answered. Patient consented to blood products? Yes  ASA Score: 2  Day of Surgery Review of History & Physical: H&P Update referred to the surgeon/provider.I have interviewed and examined the patient. I have reviewed the patient's H&P dated: There are no significant changes. H&P completed by Anesthesiologist.    Ready For Surgery From Anesthesia Perspective.     .

## 2022-12-14 NOTE — PROGRESS NOTES
Postop left total knee today  Vital signs are still  In room resting  Thigh and calf were soft  Drain in place  Palpable posterior tib pulse  Neuro intact  PT reported patient did well in rehab today they was sick in the morning prior to discharge.    H&H 13 and 42

## 2022-12-14 NOTE — PLAN OF CARE
[facility]  Initial Discharge Assessment       Primary Care Provider: Pippa Portillo DO    Admission Diagnosis: Acute pain of left knee [M25.562]  DJD (degenerative joint disease) of knee [M17.9]    Admission Date: 12/14/2022  Expected Discharge Date: 12/15/2022    Discharge Barriers Identified: None    Payor: [employer] / Plan: [employer] ([employer]) / Product Type: Commercial /     Extended Emergency Contact Information  Primary Emergency Contact: LELO LEZAMA  Mobile Phone: [phone]  Relation: Spouse  Preferred language: English   needed? No    Discharge Plan A: Home Health  Discharge Plan B: Home Health      [facility], [place] - [address] [address]  Phone: [phone] Fax: [phone]    [facility], [place] - [address]  [address] [facility] [address]  Phone: [phone] Fax: [phone]      Initial Assessment (most recent)       Adult Discharge Assessment - 12/14/22 1159          Discharge Assessment    Assessment Type Discharge Planning Assessment     Source of Information patient     Communicated VINCE with patient/caregiver Yes     People in Home spouse     Do you expect to return to your current living situation? Yes     Do you have help at home or someone to help you manage your care at home? Yes     Who are your caregiver(s) and their phone number(s)? lynne Becker [phone]     Prior to hospitilization cognitive status: Alert/Oriented     Current cognitive status: Alert/Oriented     Equipment Currently Used at Home none     Patient currently being followed by outpatient case management? No     Do you currently have service(s) that help you manage your care at home? No     Do you take prescription medications? Yes     Do you have prescription coverage? Yes     Coverage [employer]     Do you have any problems affording any of your prescribed  medications? No     Is the patient taking medications as prescribed? yes     Who is going to help you get home at discharge? wife     How do you get to doctors appointments? car, drives self;family or friend will provide     Are you on dialysis? No     Do you take coumadin? No     Discharge Plan A Home Health     Discharge Plan B Home Health     DME Needed Upon Discharge  bedside commode;walker, rolling     Discharge Plan discussed with: Patient     Discharge Barriers Identified None                   Spoke with patient in his room. Lives with his wife. She is in the room. Patient was independent with ADLs. Drove. Is employed. Patient had no home health and no equipment. Faxed order for rolling walker and bedside commode to [facility]. Called Lydia. Patient gave choice for [facility]. Faxed info and notified Kanika. Dc plan will be home with wife and home health then out patient after dc from home health at [facility].

## 2022-12-14 NOTE — TRANSFER OF CARE
"Anesthesia Transfer of Care Note    Patient: Roberto Lara    Procedure(s) Performed: Procedure(s) (LRB):  ARTHROPLASTY, KNEE, TOTAL, USING COMPUTER-ASSISTED NAVIGATION (Left)    Patient location: PACU    Anesthesia Type: general, regional and spinal    Transport from OR: Transported from OR on room air with adequate spontaneous ventilation    Post pain: adequate analgesia    Post assessment: no apparent anesthetic complications    Post vital signs: stable    Level of consciousness: awake, alert and oriented    Nausea/Vomiting: no nausea/vomiting    Complications: none    Transfer of care protocol was followed      Last vitals:   Visit Vitals  /64 (Patient Position: Lying)   Pulse 79   Temp 36.7 °C (98.1 °F)   Resp (!) 9   Ht 5' 5" (1.651 m)   Wt 72.6 kg (160 lb)   SpO2 (!) 93%   BMI 26.63 kg/m²     "

## 2022-12-14 NOTE — HPI
Patient is a 62-year-old male with past medical history of chronic pain syndrome, benign prostatic hyperplasia, paroxysmal AFib, osteoarthritis sp left Total Knee Arthroplasty , medical team consulted for medical management.  Patient is seen postprocedure, he is currently working with physical therapist and walking, he seems to be comfortable walking, does complain of some pain, however sees it is bearable.  States he just got pain medication and seems to have helped him.  He denies having any chest pain shortness of breath fever chills nausea vomiting or diarrhea.  Patient's spouse is at bedside, states that patient was recently diagnosed with paroxysmal AFib, however was never started on full-dose anticoagulation likely because of chads Vasc being 0.  Patient sees Dr. Peraza in Cardiology, had seen him recently just prior to the surgery.

## 2022-12-14 NOTE — PLAN OF CARE
Problem: Physical Therapy  Goal: Physical Therapy Goal  Description: Short term goals:  Pt will perform supine to sit with contact guard assistance  Pt will perform sit to stand with contact guard assistance  Pt will transfer from bed to chair with contact guard assistance using rolling walker  Pt will demonstrate left knee flexion range of motion from 0 to 90 degrees  Pt will ambulate 100 ft with contact guard assistanceusing rolling walker    Long term goals:  Pt will perform supine to sit with modified independence  Pt will perform sit to stand with modified independence  Pt will transfer from bed to chair with modified independence using rolling walker  Pt will ambulate 300 ft with modified independence using rolling walker    Outcome: Ongoing, Progressing

## 2022-12-14 NOTE — INTERVAL H&P NOTE
The patient has been examined and the H&P has been reviewed:    I concur with the findings and no changes have occurred since H&P was written.    Surgery risks, benefits and alternative options discussed and understood by patient/family.      Please see Dr. Vallejo's note    There are no hospital problems to display for this patient.

## 2022-12-14 NOTE — DISCHARGE INSTRUCTIONS
ANKLE: Pumps        Point toes down, then up. Repeat 30 times, 2 sessions per day        Quad Set        With other leg bent, foot flat, slowly tighten muscles on left thigh of straight leg while counting out loud to 5.  Repeat 30 times, 2 sessions per day.          Hip Abduction / Adduction: with Extended Knee (Supine)        Bring left leg out to side and return. Keep knee straight.  Repeat 30 times, 2 sessions per day.         HIP / KNEE: Flexion, Heel Slides - Supine        Slide left heel up toward buttocks, keeping leg in straight line. Repeat 30 times, 2 sessions per day.  Use towel or pillowcase under heel as needed.       Straight Leg Raise        Bend right leg. Raise left leg 8-12 inches with knee locked. Exhale and tighten thigh muscles while raising leg.   Repeat 30 times, 2 sessions per day.           KNEE: Extension, Long Arc Quads - Sitting        Raise left leg until knee is straight.  Repeat 30 times, 2 sessions per day        KNEE: Flexion / Extension - Sitting        Sit at edge of surface, foot on towel or pillowcase. Bend and straighten left knee.  Repeat 30 times, 2 sessions per day.   Use opposite leg to increase knee flexion.    *Keep dressing dry and intact, do not remove dressing, if dressing becomes wet or bloody notify home health staff.  Swingbed/Home Health will remove your drain (if you have one) on post op day #2 and change your dressing on post op day #3 and day #10, give them that special dressing we sent home with you.  *Continue incentive spirometry at least every 2 hours while awake.  *Continue white stockings remove 2 times a day for 1 hour and replace. Once dressing is changed, apply other stocking to surgery leg.   *Continue cool-jet to knee. Do not apply directly to skin.   *Take laxative of choice to have a bowel movement at least by tomorrow and then every other day.  *Increase fluids by mouth.  *Staples will be removed at follow up appointment in 2 weeks from  surgery.  *Elevate surgery leg on pillow at ankle. No pillow under knees.  *Notify [facility]/home health staff of any concerns.      Curry Catheter removal  Dr. Oreilly told Mr. Lara and his wife to pull his Curry at 3 or 4 o'clock in the morning.  He will call our office tomorrow morning if he can't void and Dr. Oreilly will see him in the office.

## 2022-12-14 NOTE — ANESTHESIA PROCEDURE NOTES
Spinal    Diagnosis: lt tka  Patient location during procedure: OR  Start time: 12/14/2022 7:26 AM  Timeout: 12/14/2022 7:25 AM  End time: 12/14/2022 7:30 AM    Staffing  Authorizing Provider: Jitendra Joshua MD  Performing Provider: Jitendra Joshua MD    Preanesthetic Checklist  Completed: patient identified, IV checked, risks and benefits discussed, surgical consent, monitors and equipment checked, pre-op evaluation and timeout performed  Spinal Block  Patient position: sitting  Prep: Betadine  Patient monitoring: heart rate, continuous pulse ox, continuous capnometry and frequent blood pressure checks  Approach: midline  Location: L3-4  Injection technique: single shot  CSF Fluid: clear free-flowing CSF  Needle  Needle type: Quincke   Needle gauge: 22 G  Needle length: 4 in  Needle localization: anatomical landmarks  Assessment  Sensory level: T8   Dermatomal levels determined by alcohol wipe  Ease of block: easy  Patient's tolerance of the procedure: comfortable throughout block and no complaints  Medications:    Medications: BUPivacaine (pf) (MARCAINE) injection 0.75% - Intraspinal   1.5 mL - 12/14/2022 7:30:00 AM

## 2022-12-15 VITALS
DIASTOLIC BLOOD PRESSURE: 58 MMHG | SYSTOLIC BLOOD PRESSURE: 114 MMHG | HEART RATE: 74 BPM | TEMPERATURE: 98 F | OXYGEN SATURATION: 95 % | RESPIRATION RATE: 18 BRPM | HEIGHT: 65 IN | WEIGHT: 160 LBS | BODY MASS INDEX: 26.66 KG/M2

## 2022-12-15 LAB
ALBUMIN SERPL BCP-MCNC: 2.8 G/DL (ref 3.5–5)
ALBUMIN/GLOB SERPL: 0.9 {RATIO}
ALP SERPL-CCNC: 88 U/L (ref 45–115)
ALT SERPL W P-5'-P-CCNC: 16 U/L (ref 16–61)
ANION GAP SERPL CALCULATED.3IONS-SCNC: 12 MMOL/L (ref 7–16)
AST SERPL W P-5'-P-CCNC: 21 U/L (ref 15–37)
BASOPHILS # BLD AUTO: 0.01 K/UL (ref 0–0.2)
BASOPHILS NFR BLD AUTO: 0.1 % (ref 0–1)
BILIRUB SERPL-MCNC: 0.3 MG/DL (ref ?–1.2)
BUN SERPL-MCNC: 20 MG/DL (ref 7–18)
BUN/CREAT SERPL: 14 (ref 6–20)
CALCIUM SERPL-MCNC: 8.3 MG/DL (ref 8.5–10.1)
CHLORIDE SERPL-SCNC: 103 MMOL/L (ref 98–107)
CO2 SERPL-SCNC: 25 MMOL/L (ref 21–32)
CREAT SERPL-MCNC: 1.46 MG/DL (ref 0.7–1.3)
DIFFERENTIAL METHOD BLD: ABNORMAL
EGFR (NO RACE VARIABLE) (RUSH/TITUS): 54 ML/MIN/1.73M²
EOSINOPHIL # BLD AUTO: 0 K/UL (ref 0–0.5)
EOSINOPHIL NFR BLD AUTO: 0 % (ref 1–4)
ERYTHROCYTE [DISTWIDTH] IN BLOOD BY AUTOMATED COUNT: 12.8 % (ref 11.5–14.5)
GLOBULIN SER-MCNC: 3.1 G/DL (ref 2–4)
GLUCOSE SERPL-MCNC: 166 MG/DL (ref 74–106)
HCT VFR BLD AUTO: 36.8 % (ref 40–54)
HGB BLD-MCNC: 12.1 G/DL (ref 13.5–18)
IMM GRANULOCYTES # BLD AUTO: 0.13 K/UL (ref 0–0.04)
IMM GRANULOCYTES NFR BLD: 0.7 % (ref 0–0.4)
LYMPHOCYTES # BLD AUTO: 0.73 K/UL (ref 1–4.8)
LYMPHOCYTES NFR BLD AUTO: 3.9 % (ref 27–41)
MCH RBC QN AUTO: 30.3 PG (ref 27–31)
MCHC RBC AUTO-ENTMCNC: 32.9 G/DL (ref 32–36)
MCV RBC AUTO: 92 FL (ref 80–96)
MONOCYTES # BLD AUTO: 1.07 K/UL (ref 0–0.8)
MONOCYTES NFR BLD AUTO: 5.7 % (ref 2–6)
MPC BLD CALC-MCNC: 9.2 FL (ref 9.4–12.4)
NEUTROPHILS # BLD AUTO: 16.88 K/UL (ref 1.8–7.7)
NEUTROPHILS NFR BLD AUTO: 89.6 % (ref 53–65)
NRBC # BLD AUTO: 0 X10E3/UL
NRBC, AUTO (.00): 0 %
PLATELET # BLD AUTO: 177 K/UL (ref 150–400)
POTASSIUM SERPL-SCNC: 4.5 MMOL/L (ref 3.5–5.1)
PROT SERPL-MCNC: 5.9 G/DL (ref 6.4–8.2)
RBC # BLD AUTO: 4 M/UL (ref 4.6–6.2)
SODIUM SERPL-SCNC: 135 MMOL/L (ref 136–145)
WBC # BLD AUTO: 18.82 K/UL (ref 4.5–11)

## 2022-12-15 PROCEDURE — 97535 SELF CARE MNGMENT TRAINING: CPT

## 2022-12-15 PROCEDURE — 63600175 PHARM REV CODE 636 W HCPCS: Performed by: ORTHOPAEDIC SURGERY

## 2022-12-15 PROCEDURE — 99214 PR OFFICE/OUTPT VISIT, EST, LEVL IV, 30-39 MIN: ICD-10-PCS | Mod: ,,, | Performed by: INTERNAL MEDICINE

## 2022-12-15 PROCEDURE — 80053 COMPREHEN METABOLIC PANEL: CPT | Performed by: ORTHOPAEDIC SURGERY

## 2022-12-15 PROCEDURE — 94761 N-INVAS EAR/PLS OXIMETRY MLT: CPT

## 2022-12-15 PROCEDURE — 36415 COLL VENOUS BLD VENIPUNCTURE: CPT | Performed by: ORTHOPAEDIC SURGERY

## 2022-12-15 PROCEDURE — 25000003 PHARM REV CODE 250: Performed by: INTERNAL MEDICINE

## 2022-12-15 PROCEDURE — 99214 OFFICE O/P EST MOD 30 MIN: CPT | Mod: ,,, | Performed by: INTERNAL MEDICINE

## 2022-12-15 PROCEDURE — 25000003 PHARM REV CODE 250: Performed by: ORTHOPAEDIC SURGERY

## 2022-12-15 PROCEDURE — 97116 GAIT TRAINING THERAPY: CPT

## 2022-12-15 PROCEDURE — 85025 COMPLETE CBC W/AUTO DIFF WBC: CPT | Performed by: ORTHOPAEDIC SURGERY

## 2022-12-15 PROCEDURE — 97110 THERAPEUTIC EXERCISES: CPT

## 2022-12-15 RX ADMIN — DEXTROSE MONOHYDRATE 2 G: 50 INJECTION, SOLUTION INTRAVENOUS at 12:12

## 2022-12-15 RX ADMIN — ASPIRIN 325 MG: 325 TABLET, COATED ORAL at 08:12

## 2022-12-15 RX ADMIN — TAMSULOSIN HYDROCHLORIDE 0.4 MG: 0.4 CAPSULE ORAL at 08:12

## 2022-12-15 RX ADMIN — HYDROCODONE BITARTRATE AND ACETAMINOPHEN 1 TABLET: 5; 325 TABLET ORAL at 08:12

## 2022-12-15 RX ADMIN — DULOXETINE 20 MG: 20 CAPSULE, DELAYED RELEASE ORAL at 08:12

## 2022-12-15 RX ADMIN — METOPROLOL TARTRATE 50 MG: 50 TABLET, FILM COATED ORAL at 08:12

## 2022-12-15 NOTE — PT/OT/SLP PROGRESS
Physical Therapy Treatment    Patient Name:  Roberto Lara   MRN:  [id number]    Recommendations:     Discharge Recommendations: home health PT  Discharge Equipment Recommendations: bedside commode, walker, rolling  Barriers to discharge: None    Assessment:     Roberto Lara is a 62 y.o. male admitted with a medical diagnosis of DJD (degenerative joint disease) of knee.  He presents with the following impairments/functional limitations: weakness, impaired functional mobility, gait instability, decreased lower extremity function, pain, decreased ROM Pt demonstrates good range of motion and normal post op pain. Ambulates well with rolling walker. Concerned about negotiating step into home but did well in stairway. Should be okay to return home at d/c.    Rehab Prognosis: Good; patient would benefit from acute skilled PT services to address these deficits and reach maximum level of function.    Recent Surgery: Procedure(s) (LRB):  ARTHROPLASTY, KNEE, TOTAL, USING COMPUTER-ASSISTED NAVIGATION (Left) 1 Day Post-Op    Plan:     During this hospitalization, patient to be seen BID (5x/wk, daily 2x/wk) to address the identified rehab impairments via gait training, therapeutic activities, therapeutic exercises and progress toward the following goals:    Plan of Care Expires:  01/14/23    Subjective     Chief Complaint: left total knee replacement   Patient/Family Comments/goals: pt concerned about negotiating step into home  Pain/Comfort:  Pain Rating 1: 5/10  Location - Side 1: Left  Location 1: knee  Pain Addressed 1: Pre-medicate for activity  Pain Rating Post-Intervention 1: 7/10      Objective:     Communicated with KAITLIN Gomez RN prior to session.  Patient found supine with hemovac, cortez catheter, peripheral IV upon PT entry to room.     General Precautions: Standard, fall  Orthopedic Precautions: LLE weight bearing as tolerated  Braces: N/A  Respiratory Status: Room air     Functional Mobility:  Bed Mobility:     Scooting:  contact guard assistance  Supine to Sit: contact guard assistance  Transfers:     Sit to Stand:  contact guard assistance with rolling walker  Bed to Chair: contact guard assistance with  rolling walker  using  Step Transfer  Gait: 80 ft x 2 trials, contact guard assistance with rolling walker, slow juan antonio, step to pattern  Balance: good  Stairs:  Pt ascended/descended 2 stair(s) with Rolling Walker with right handrail with Contact Guard Assistance.       AM-PAC 6 CLICK MOBILITY  Turning over in bed (including adjusting bedclothes, sheets and blankets)?: 4  Sitting down on and standing up from a chair with arms (e.g., wheelchair, bedside commode, etc.): 4  Moving from lying on back to sitting on the side of the bed?: 4  Moving to and from a bed to a chair (including a wheelchair)?: 4  Need to walk in hospital room?: 4  Climbing 3-5 steps with a railing?: 3  Basic Mobility Total Score: 23       Treatment & Education:  Pt performed left LE: ankle pumps, short arc quads, heel slides, and straight leg raises x 30 each  Left knee flexion ROM 4-90 degrees  Left knee extension stretch 3 x 30 seconds   Gait training with rolling walker, stair training    Patient left up in chair with all lines intact and call button in reach..    GOALS:   Multidisciplinary Problems       Physical Therapy Goals          Problem: Physical Therapy    Goal Priority Disciplines Outcome Goal Variances Interventions   Physical Therapy Goal     PT, PT/OT Ongoing, Progressing     Description: Short term goals:  Pt will perform supine to sit with contact guard assistance  Pt will perform sit to stand with contact guard assistance  Pt will transfer from bed to chair with contact guard assistance using rolling walker  Pt will demonstrate left knee flexion range of motion from 0 to 90 degrees  Pt will ambulate 100 ft with contact guard assistanceusing rolling walker    Long term goals:  Pt will perform supine to sit with modified independence  Pt will  perform sit to stand with modified independence  Pt will transfer from bed to chair with modified independence using rolling walker  Pt will ambulate 300 ft with modified independence using rolling walker                         Time Tracking:     PT Received On: 12/15/22  PT Start Time: 0900 (1100)     PT Stop Time: 0940 (1112)  PT Total Time (min): 52 min     Billable Minutes: Gait Training 30 and Therapeutic Exercise 15    Treatment Type: Treatment  PT/PTA: PT     PTA Visit Number: 0     12/15/2022

## 2022-12-15 NOTE — PLAN OF CARE
[facility]  Discharge Final Note    Primary Care Provider: Pipap Portillo DO    Expected Discharge Date: 12/15/2022    Final Discharge Note (most recent)       Final Note - 12/15/22 1122          Final Note    Assessment Type Final Discharge Note     Anticipated Discharge Disposition Home-Health Care Svc        Post-Acute Status    Post-Acute Authorization Home Health     Home Health Status Set-up Complete/Auth obtained     Patient choice form signed by patient/caregiver List with quality metrics by geographic area provided;List from CMS Compare;List from System Post-Acute Care     Discharge Delays None known at this time                     Important Message from Medicare              Follow-up providers       Ron Fitzpatrick III, MD   Specialty: Orthopedic Surgery    [address] [place] [address]   Phone: [phone]       Next Steps: Call in 2 week(s)    Instructions: Please follow up on December 29 at 9:20    Nazia Hendrix DO   Specialty: Nephrology    [address] [place] [address]   Phone: [phone]       Next Steps: Follow up in 1 week(s)    Instructions: establish care for CKD.  Please follow up on December 27 at 10:30              After-discharge care                Durable Medical Equipment       [facility]   Service: Durable Medical Equipment    [address] [place] [address]   Phone: [phone]                 Home Medical Care       [facility]   Service: Home Health Services    [address]  [facility] [address]   Phone: [phone]                             Spoke with patient in his room. Dc plan for today. Home health set up and equipment has been delivered to patients room.
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
## 2022-12-15 NOTE — PT/OT/SLP PROGRESS
Occupational Therapy   Treatment    Name: Roberto Lara  MRN: [id number]  Admitting Diagnosis:  DJD (degenerative joint disease) of knee  1 Day Post-Op    Recommendations:     Discharge Recommendations: home with home health  Discharge Equipment Recommendations:  bedside commode, walker, rolling  Barriers to discharge:  None    Assessment:     Roberto Lara is a 62 y.o. male with a medical diagnosis of DJD (degenerative joint disease) of knee.  He presents with s/p left TKR on 12/14/22. Performance deficits affecting function are impaired self care skills, impaired functional mobility, gait instability, impaired balance, pain.     Rehab Prognosis:  Good; patient would benefit from acute skilled OT services to address these deficits and reach maximum level of function.       Plan:     Patient to be seen 5 x/week to address the above listed problems via self-care/home management, therapeutic activities, therapeutic exercises  Plan of Care Expires: 12/21/22  Plan of Care Reviewed with: patient    Subjective     Pain/Comfort:  Pain Rating 1: 6/10  Location - Side 1: Left  Location 1: knee    Objective:     Communicated with: TYRON Rodriguez prior to session.  Patient found up in chair with hemovac, cortez catheter, peripheral IV upon OT entry to room.    General Precautions: Standard, fall    Orthopedic Precautions:LLE weight bearing as tolerated  Braces: Knee immobilizer  Respiratory Status: Room air     Occupational Performance:     Bed Mobility:    Not performed     Functional Mobility/Transfers:  Patient completed Sit <> Stand Transfer with contact guard assistance  with  rolling walker   Functional Mobility: not performed    Activities of Daily Living:  Upper Body Dressing: modified independence to shaneka shirt  Lower Body Dressing: minimum assistance to shaneka pants      AMPAC 6 Click ADL:      Treatment & Education:  Pt performed ADL training as listed above.    Patient left up in chair with all lines intact, call button in  reach, and wife present    GOALS:   Multidisciplinary Problems       Occupational Therapy Goals          Problem: Occupational Therapy    Goal Priority Disciplines Outcome Interventions   Occupational Therapy Goal     OT, PT/OT Ongoing, Progressing    Description: ST.Pt will perform bathing with Domonique with setup at EOB  2.Pt will perform UE dressing with Judith  3.Pt will perform LE dressing with Domonique with AD if needed  4.Pt will transfer bed/chair/bsc with SBA with RW  5.Pt will perform standing task x 2 min with SBA with RW  6.Tolerate 15 min of tx without fatigue.      LTG:   Restore to max I with selfcare and mobility.                           Time Tracking:     OT Date of Treatment: 12/15/22  OT Start Time: 1004  OT Stop Time: 1021  OT Total Time (min): 17 min    Billable Minutes:Self Care/Home Management 17 minutes    OT/KENDRA: OT          12/15/2022

## 2022-12-15 NOTE — NURSING
0720 Pt sitting up in bed. Visitor at bedside. No distress noted. No complaints or requests at this time. Call bell in reach. Side rails up x2.    0947 Pt sitting up in bed. Visitor at bedside. No distress noted. No complaints or requests at this time. Call bell in reach. Side rails up x2.

## 2022-12-15 NOTE — SUBJECTIVE & OBJECTIVE
Interval History:     Soco  Doing well  Outpaeint follow up for nephrology for ckd eval  Outpatient follow up for sleep study    Review of Systems   Constitutional: Negative.  Negative for chills, fatigue and fever.   HENT: Negative.  Negative for congestion and rhinorrhea.    Respiratory:  Negative for apnea, cough, chest tightness and shortness of breath.    Cardiovascular: Negative.  Negative for chest pain and leg swelling.   Gastrointestinal: Negative.  Negative for abdominal pain, diarrhea, nausea and vomiting.   Endocrine: Negative.    Genitourinary: Negative.    Musculoskeletal:  Positive for arthralgias.   Skin: Negative.    Allergic/Immunologic: Negative.    Neurological: Negative.    Hematological: Negative.    Psychiatric/Behavioral: Negative.     Objective:     Vital Signs (Most Recent):  Temp: 97.7 °F (36.5 °C) (12/15/22 0643)  Pulse: 74 (12/15/22 0643)  Resp: 18 (12/15/22 0842)  BP: (!) 114/58 (12/15/22 0643)  SpO2: 95 % (12/15/22 0643)   Vital Signs (24h Range):  Temp:  [97.7 °F (36.5 °C)-98.8 °F (37.1 °C)] 97.7 °F (36.5 °C)  Pulse:  [65-81] 74  Resp:  [8-18] 18  SpO2:  [93 %-98 %] 95 %  BP: (107-135)/(51-74) 114/58     Weight: 72.6 kg (160 lb)  Body mass index is 26.63 kg/m².    Intake/Output Summary (Last 24 hours) at 12/15/2022 0851  Last data filed at 12/15/2022 0421  Gross per 24 hour   Intake --   Output 1865 ml   Net -1865 ml      Physical Exam  Vitals reviewed.   Constitutional:       Appearance: Normal appearance.   HENT:      Head: Normocephalic and atraumatic.      Nose: Nose normal. No congestion.      Mouth/Throat:      Pharynx: Oropharynx is clear.   Eyes:      Extraocular Movements: Extraocular movements intact.      Conjunctiva/sclera: Conjunctivae normal.      Pupils: Pupils are equal, round, and reactive to light.   Cardiovascular:      Rate and Rhythm: Normal rate and regular rhythm.      Pulses: Normal pulses.      Heart sounds: Normal heart sounds.   Pulmonary:      Effort:  Pulmonary effort is normal. No respiratory distress.      Breath sounds: Normal breath sounds. No wheezing.   Abdominal:      General: Bowel sounds are normal. There is no distension.      Palpations: Abdomen is soft.      Tenderness: There is no abdominal tenderness.   Musculoskeletal:         General: Tenderness present.   Lymphadenopathy:      Cervical: No cervical adenopathy.   Skin:     General: Skin is warm.   Neurological:      General: No focal deficit present.      Mental Status: He is alert. Mental status is at baseline.   Psychiatric:         Mood and Affect: Mood normal.       Significant Labs: All pertinent labs within the past 24 hours have been reviewed.    Significant Imaging: I have reviewed all pertinent imaging results/findings within the past 24 hours.

## 2022-12-15 NOTE — NURSING
Discharge instructions reviewed with patient and spouse; and copy given to patient. Patient and spouse voiced understanding regarding:meds, appt., signs and symptoms to report to physician.  *Keep dressing dry and intact, do not remove dressing, if dressing becomes wet or bloody notify home health staff.  Swingbed/Home Health will remove your drain (if you have one) on post op day #2 and change your dressing on post op day #3 and day #10, give them that special dressing we sent home with you.  *Continue incentive spirometry at least every 2 hours while awake.  *Continue white stockings remove 2 times a day for 1 hour and replace. Once dressing is changed, apply other stocking to surgery leg.   *Continue cool-jet to knee. Do not apply directly to skin.   *Take laxative of choice to have a bowel movement at least by tomorrow and then every other day.  *Increase fluids by mouth.  *Staples will be removed at follow up appointment in 2 weeks from surgery.  *Elevate surgery leg on pillow at ankle. No pillow under knees.  *Notify swingbed/home health staff of any concerns.

## 2022-12-15 NOTE — PROGRESS NOTES
[facility]  [facility] Medicine  Progress Note    Patient Name: Roberto Lara  MRN: [id number]  Patient Class: OP- Hospital Outpatient Surgery   Admission Date: 12/14/2022  Length of Stay: 0 days  Attending Physician: Ron Fitzpatrick III, MD  Primary Care Provider: Pippa Portillo DO        Subjective:     Principal Problem:DJD (degenerative joint disease) of knee        HPI:  Patient is a 62-year-old male with past medical history of chronic pain syndrome, benign prostatic hyperplasia, paroxysmal AFib, osteoarthritis sp left Total Knee Arthroplasty , medical team consulted for medical management.  Patient is seen postprocedure, he is currently working with physical therapist and walking, he seems to be comfortable walking, does complain of some pain, however sees it is bearable.  States he just got pain medication and seems to have helped him.  He denies having any chest pain shortness of breath fever chills nausea vomiting or diarrhea.  Patient's spouse is at bedside, states that patient was recently diagnosed with paroxysmal AFib, however was never started on full-dose anticoagulation likely because of chads Vasc being 0.  Patient sees Dr. Peraza in Cardiology, had seen him recently just prior to the surgery.      Overview/Hospital Course:  No notes on file    Interval History:     Naeo  Doing well  Outpaeint follow up for nephrology for ckd eval  Outpatient follow up for sleep study    Review of Systems   Constitutional: Negative.  Negative for chills, fatigue and fever.   HENT: Negative.  Negative for congestion and rhinorrhea.    Respiratory:  Negative for apnea, cough, chest tightness and shortness of breath.    Cardiovascular: Negative.  Negative for chest pain and leg swelling.   Gastrointestinal: Negative.  Negative for abdominal pain, diarrhea, nausea and vomiting.   Endocrine: Negative.    Genitourinary: Negative.    Musculoskeletal:  Positive for arthralgias.   Skin: Negative.     Allergic/Immunologic: Negative.    Neurological: Negative.    Hematological: Negative.    Psychiatric/Behavioral: Negative.     Objective:     Vital Signs (Most Recent):  Temp: 97.7 °F (36.5 °C) (12/15/22 0643)  Pulse: 74 (12/15/22 0643)  Resp: 18 (12/15/22 0842)  BP: (!) 114/58 (12/15/22 0643)  SpO2: 95 % (12/15/22 0643)   Vital Signs (24h Range):  Temp:  [97.7 °F (36.5 °C)-98.8 °F (37.1 °C)] 97.7 °F (36.5 °C)  Pulse:  [65-81] 74  Resp:  [8-18] 18  SpO2:  [93 %-98 %] 95 %  BP: (107-135)/(51-74) 114/58     Weight: 72.6 kg (160 lb)  Body mass index is 26.63 kg/m².    Intake/Output Summary (Last 24 hours) at 12/15/2022 0851  Last data filed at 12/15/2022 0421  Gross per 24 hour   Intake --   Output 1865 ml   Net -1865 ml      Physical Exam  Vitals reviewed.   Constitutional:       Appearance: Normal appearance.   HENT:      Head: Normocephalic and atraumatic.      Nose: Nose normal. No congestion.      Mouth/Throat:      Pharynx: Oropharynx is clear.   Eyes:      Extraocular Movements: Extraocular movements intact.      Conjunctiva/sclera: Conjunctivae normal.      Pupils: Pupils are equal, round, and reactive to light.   Cardiovascular:      Rate and Rhythm: Normal rate and regular rhythm.      Pulses: Normal pulses.      Heart sounds: Normal heart sounds.   Pulmonary:      Effort: Pulmonary effort is normal. No respiratory distress.      Breath sounds: Normal breath sounds. No wheezing.   Abdominal:      General: Bowel sounds are normal. There is no distension.      Palpations: Abdomen is soft.      Tenderness: There is no abdominal tenderness.   Musculoskeletal:         General: Tenderness present.   Lymphadenopathy:      Cervical: No cervical adenopathy.   Skin:     General: Skin is warm.   Neurological:      General: No focal deficit present.      Mental Status: He is alert. Mental status is at baseline.   Psychiatric:         Mood and Affect: Mood normal.       Significant Labs: All pertinent labs within the  past 24 hours have been reviewed.    Significant Imaging: I have reviewed all pertinent imaging results/findings within the past 24 hours.      Assessment/Plan:      * DJD (degenerative joint disease) of knee  Primary team managing, defer pain management and DVT prophylaxis to primary team.      BPH (benign prostatic hyperplasia)  tamsulosin      Chronic pain of left knee  Prn pain meds      Paroxysmal A-fib  Patient with Paroxysmal (<7 days) atrial fibrillation which is controlled currently with Beta Blocker. Patient is currently in sinus rhythm.[id number] Score 0: The patient doesn't have any registry metric data available. . Anticoagulation not indicated due to likely 0 chadvasc. per them cardiology never started him on it saying he doesnt need it. outpatient cardiology follow up. .          VTE Risk Mitigation (From admission, onward)         Ordered     IP VTE HIGH RISK PATIENT  Once         12/14/22 1011     Place MELISSA hose  Until discontinued         12/14/22 1011                Discharge Planning   VINCE: 12/15/2022     Code Status: Full Code   Is the patient medically ready for discharge?:     Reason for patient still in hospital (select all that apply): Treatment  Discharge Plan A: Home Health                  Rehmat SHERINE Whipple MD  Department of Hospital Medicine   [facility]

## 2022-12-15 NOTE — DISCHARGE SUMMARY
"                      Department of Orthopedic Surgery                 DISCHARGE SUMMARY           Patient Name: Roberto Lara  : 1960  Age: 62 y.o.  Sex: male  LOS: 0    ADMISSION INFORMATION:  Admit Date: 2022    DISCHARGE INFORMATION:  Discharge Date/Time:  12/15/2022  Discharge Physician: Amari  Discharged Condition: Stable      Admitting Diagnoses:  Left knee end-stage DJD    PROCEDURE:  Procedure(s) (LRB):  ARTHROPLASTY, KNEE, TOTAL, USING COMPUTER-ASSISTED NAVIGATION (Left)        Discharge Diagnoses:  Left knee end-stage DJD    HOSPITAL COURSE:   Roberto Lara  is a 62 y.o. patient underwent a left total knee replacement with adductor canal block spinal and general anesthetic please see op report is a size 5 tibial tray a 7 poly and a 3 femur posterior stabilized and a 35 patella    On postop rounds patient had no recorded urine on questioning the patient he would not urinated got Urology involved he had have a dilatation urethra please see Dr. Oreilly's consult and the plan is to go home with his catheter we will clarify long      Physical Exam for Today :  BP (!) 114/58   Pulse 74   Temp 97.7 °F (36.5 °C)   Resp 18   Ht 5' 5" (1.651 m)   Wt 72.6 kg (160 lb)   SpO2 95%   BMI 26.63 kg/m²     Dressing is dry his drain is in place thigh and calf were soft neurovascularly intact      IMAGING: X-Ray Knee 1 or 2 View Left  Narrative: EXAMINATION:  XR KNEE 1 OR 2 VIEW LEFT    CLINICAL HISTORY:  Postop total;    TECHNIQUE:  AP and lateral left knee    COMPARISON:  Preoperative exam 11/15/2022    FINDINGS:  Postoperative changes are seen from left total knee arthroplasty.  Prosthetic projects in the expected location.  No acute abnormality.  Impression: Postoperative changes    Electronically signed by: Guy Escobar  Date:    2022  Time:    10:53        .      DISCHARGE MEDS:  Coated aspirin full-strength 1 p.o. b.i.d. Percocet for pain    CONSULTS: IP CONSULT TO HOSPITAL MEDICINE  IP CONSULT TO " SOCIAL WORK/CASE MANAGEMENT  IP CONSULT TO UROLOGY    Follow up:  Two weeks    DISPOSITION: Home-Health Care c for PT and home health    CONDITION: Stable              Ron Fitzpatrick III  12/15/2022, 8:16 AM        (Subject to voice recognition error, transcription service not allowed)

## 2022-12-19 ENCOUNTER — TELEPHONE (OUTPATIENT)
Dept: ORTHOPEDICS | Facility: HOSPITAL | Age: 62
End: 2022-12-19
Payer: COMMERCIAL

## 2022-12-19 NOTE — TELEPHONE ENCOUNTER
Is your pain tolerable? yes      Has Home health therapy/outpatient therapy come to see you? yes      Are you taking your ecotrin/lovenox/eliquis? aspirin      Have you had a bowel movement since surgery? yes      Are you wearing your MELISSA hose? yes      Are you doing ankle pumps?yes      Are you doing your incentive spirometry?yes      Any complaints/concerns?  none

## 2022-12-29 PROBLEM — Z96.652 STATUS POST TOTAL LEFT KNEE REPLACEMENT: Status: ACTIVE | Noted: 2022-12-29

## 2023-01-24 ENCOUNTER — TELEPHONE (OUTPATIENT)
Dept: SLEEP MEDICINE | Facility: CLINIC | Age: 63
End: 2023-01-24
Payer: COMMERCIAL

## 2023-01-25 ENCOUNTER — OFFICE VISIT (OUTPATIENT)
Dept: SLEEP MEDICINE | Facility: CLINIC | Age: 63
End: 2023-01-25
Attending: FAMILY MEDICINE
Payer: COMMERCIAL

## 2023-01-25 VITALS
WEIGHT: 166.81 LBS | BODY MASS INDEX: 27.79 KG/M2 | HEART RATE: 74 BPM | OXYGEN SATURATION: 98 % | SYSTOLIC BLOOD PRESSURE: 101 MMHG | HEIGHT: 65 IN | DIASTOLIC BLOOD PRESSURE: 55 MMHG

## 2023-01-25 DIAGNOSIS — I48.0 PAROXYSMAL A-FIB: ICD-10-CM

## 2023-01-25 DIAGNOSIS — R06.81 APNEA: ICD-10-CM

## 2023-01-25 DIAGNOSIS — G47.8 OTHER SLEEP DISORDERS: Primary | ICD-10-CM

## 2023-01-25 PROCEDURE — 3074F PR MOST RECENT SYSTOLIC BLOOD PRESSURE < 130 MM HG: ICD-10-PCS | Mod: CPTII,,, | Performed by: FAMILY MEDICINE

## 2023-01-25 PROCEDURE — 3074F SYST BP LT 130 MM HG: CPT | Mod: CPTII,,, | Performed by: FAMILY MEDICINE

## 2023-01-25 PROCEDURE — 99214 OFFICE O/P EST MOD 30 MIN: CPT | Mod: PBBFAC,PN | Performed by: FAMILY MEDICINE

## 2023-01-25 PROCEDURE — 1159F PR MEDICATION LIST DOCUMENTED IN MEDICAL RECORD: ICD-10-PCS | Mod: CPTII,,, | Performed by: FAMILY MEDICINE

## 2023-01-25 PROCEDURE — 3008F PR BODY MASS INDEX (BMI) DOCUMENTED: ICD-10-PCS | Mod: CPTII,,, | Performed by: FAMILY MEDICINE

## 2023-01-25 PROCEDURE — 3008F BODY MASS INDEX DOCD: CPT | Mod: CPTII,,, | Performed by: FAMILY MEDICINE

## 2023-01-25 PROCEDURE — 3078F PR MOST RECENT DIASTOLIC BLOOD PRESSURE < 80 MM HG: ICD-10-PCS | Mod: CPTII,,, | Performed by: FAMILY MEDICINE

## 2023-01-25 PROCEDURE — 1160F PR REVIEW ALL MEDS BY PRESCRIBER/CLIN PHARMACIST DOCUMENTED: ICD-10-PCS | Mod: CPTII,,, | Performed by: FAMILY MEDICINE

## 2023-01-25 PROCEDURE — 99203 PR OFFICE/OUTPT VISIT, NEW, LEVL III, 30-44 MIN: ICD-10-PCS | Mod: S$PBB,,, | Performed by: FAMILY MEDICINE

## 2023-01-25 PROCEDURE — 99203 OFFICE O/P NEW LOW 30 MIN: CPT | Mod: S$PBB,,, | Performed by: FAMILY MEDICINE

## 2023-01-25 PROCEDURE — 3078F DIAST BP <80 MM HG: CPT | Mod: CPTII,,, | Performed by: FAMILY MEDICINE

## 2023-01-25 PROCEDURE — 1160F RVW MEDS BY RX/DR IN RCRD: CPT | Mod: CPTII,,, | Performed by: FAMILY MEDICINE

## 2023-01-25 PROCEDURE — 1159F MED LIST DOCD IN RCRD: CPT | Mod: CPTII,,, | Performed by: FAMILY MEDICINE

## 2023-01-25 NOTE — PROGRESS NOTES
Patient presents to sleep clinic for initial evaluation.  Patient has Afib and cardiologist wanted him to be evaluated.  Patient denies accident or near accident due to drowsy driving.

## 2023-01-25 NOTE — PROGRESS NOTES
Subjective:       Patient ID: Roberto Lara is a 62 y.o. male.    Chief Complaint: Snoring    Patient is seen in sleep clinic with a complaint of loud snoring, difficulty maintaining sleep, excessive daytime fatigue and now diagnosed with atrial fibrillation.  The patient is a  and is usually in bed on work days at 10:00 p.m. and up for the day at 4:30 a.m. stating he is up to the bathroom 1-2 times a night.  The patient now has atrial fibrillation.    Review of Systems   Constitutional:  Positive for fatigue.   Respiratory:  Negative for shortness of breath and wheezing.    Neurological:  Negative for headaches and memory loss.   Psychiatric/Behavioral:  Positive for sleep disturbance.  Denies restless legs, cataplexy, sleep paralysis, hypnagogic or hypnopompic hallucinations.  Admits to loud snoring and difficulty maintaining sleep.      Objective:      Physical Exam  Vitals reviewed.   Constitutional:       General: He is not in acute distress.  HENT:      Head: Normocephalic.      Nose: Nose normal. No septal deviation.      Mouth/Throat:      Tongue: No lesions. Tongue does not deviate from midline.      Pharynx: Uvula midline.      Comments: Mallampati Class IV  No Macroglossia, No Micrognathia, and No Retrognathia observed.     Neck:      Thyroid: No thyroid mass, thyromegaly or thyroid tenderness.      Vascular: No carotid bruit.      Comments: Thyroid midline without masses or enlargement.  Cardiovascular:      Rate and Rhythm: Normal rate and regular rhythm.      Heart sounds: Normal heart sounds. No murmur heard.    No friction rub. No gallop.   Pulmonary:      Effort: Pulmonary effort is normal.      Breath sounds: Normal breath sounds.   Musculoskeletal:      Cervical back: Neck supple.      Right lower leg: No edema.      Left lower leg: No edema.   Lymphadenopathy:      Cervical:      Right cervical: No superficial or posterior cervical adenopathy.     Left cervical: No superficial or  posterior cervical adenopathy.   Skin:     General: Skin is warm and dry.   Neurological:      Mental Status: He is alert and oriented to person, place, and time.   Psychiatric:         Attention and Perception: Attention normal.         Mood and Affect: Mood and affect normal.         Speech: Speech normal.         Behavior: Behavior normal. Behavior is cooperative.       Assessment:       Problem List Items Addressed This Visit          Pulmonary    Apnea       Cardiac/Vascular    Paroxysmal A-fib     Other Visit Diagnoses       Other sleep disorders    -  Primary              Plan:       1. Polysomnography  2. Caution while operating machinery

## 2023-01-26 ENCOUNTER — OFFICE VISIT (OUTPATIENT)
Dept: ORTHOPEDICS | Facility: CLINIC | Age: 63
End: 2023-01-26
Payer: COMMERCIAL

## 2023-01-26 ENCOUNTER — HOSPITAL ENCOUNTER (OUTPATIENT)
Dept: RADIOLOGY | Facility: HOSPITAL | Age: 63
Discharge: HOME | End: 2023-01-26
Attending: ORTHOPAEDIC SURGERY
Payer: COMMERCIAL

## 2023-01-26 DIAGNOSIS — Z96.652 STATUS POST TOTAL LEFT KNEE REPLACEMENT: Primary | ICD-10-CM

## 2023-01-26 DIAGNOSIS — Z96.652 STATUS POST TOTAL LEFT KNEE REPLACEMENT: ICD-10-CM

## 2023-01-26 PROCEDURE — 73560 X-RAY EXAM OF KNEE 1 OR 2: CPT | Mod: TC,LT

## 2023-01-26 PROCEDURE — 73560 XR KNEE 1 OR 2 VIEW LEFT: ICD-10-PCS | Mod: 26,LT,, | Performed by: ORTHOPAEDIC SURGERY

## 2023-01-26 PROCEDURE — 73560 X-RAY EXAM OF KNEE 1 OR 2: CPT | Mod: 26,LT,, | Performed by: ORTHOPAEDIC SURGERY

## 2023-01-26 PROCEDURE — 99024 POSTOP FOLLOW-UP VISIT: CPT | Mod: ,,, | Performed by: ORTHOPAEDIC SURGERY

## 2023-01-26 PROCEDURE — 99024 PR POST-OP FOLLOW-UP VISIT: ICD-10-PCS | Mod: ,,, | Performed by: ORTHOPAEDIC SURGERY

## 2023-01-26 PROCEDURE — 99212 OFFICE O/P EST SF 10 MIN: CPT | Mod: PBBFAC | Performed by: ORTHOPAEDIC SURGERY

## 2023-01-26 NOTE — PROGRESS NOTES
CC:    Chief Complaint   Patient presents with    Follow-up     LT TKR 12/14 (6WKS)           Previos History :        History:  1/26/2023   Roberto Lara is a 62 y.o.  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

## 2023-02-13 ENCOUNTER — PROCEDURE VISIT (OUTPATIENT)
Dept: SLEEP MEDICINE | Facility: HOSPITAL | Age: 63
End: 2023-02-13
Attending: FAMILY MEDICINE
Payer: COMMERCIAL

## 2023-02-13 DIAGNOSIS — G47.8 OTHER SLEEP DISORDERS: ICD-10-CM

## 2023-02-13 DIAGNOSIS — I48.0 PAROXYSMAL A-FIB: ICD-10-CM

## 2023-02-21 PROCEDURE — 95806 SLEEP STUDY UNATT&RESP EFFT: CPT | Mod: 26,,, | Performed by: FAMILY MEDICINE

## 2023-02-21 PROCEDURE — 95806 PR SLEEP STUDY, UNATTENDED, SIMUL RECORD HR/O2 SAT/RESP FLOW/RESP EFFT: ICD-10-PCS | Mod: 26,,, | Performed by: FAMILY MEDICINE

## 2023-02-27 DIAGNOSIS — Z96.652 STATUS POST TOTAL LEFT KNEE REPLACEMENT: Primary | ICD-10-CM

## 2023-02-28 ENCOUNTER — HOSPITAL ENCOUNTER (OUTPATIENT)
Dept: RADIOLOGY | Facility: HOSPITAL | Age: 63
Discharge: HOME | End: 2023-02-28
Attending: ORTHOPAEDIC SURGERY
Payer: COMMERCIAL

## 2023-02-28 ENCOUNTER — OFFICE VISIT (OUTPATIENT)
Dept: ORTHOPEDICS | Facility: CLINIC | Age: 63
End: 2023-02-28
Payer: COMMERCIAL

## 2023-02-28 DIAGNOSIS — Z96.652 STATUS POST TOTAL LEFT KNEE REPLACEMENT: Primary | ICD-10-CM

## 2023-02-28 DIAGNOSIS — Z96.652 STATUS POST TOTAL LEFT KNEE REPLACEMENT: ICD-10-CM

## 2023-02-28 PROCEDURE — 99212 OFFICE O/P EST SF 10 MIN: CPT | Mod: PBBFAC | Performed by: ORTHOPAEDIC SURGERY

## 2023-02-28 PROCEDURE — 73560 X-RAY EXAM OF KNEE 1 OR 2: CPT | Mod: TC,LT

## 2023-02-28 PROCEDURE — 73560 X-RAY EXAM OF KNEE 1 OR 2: CPT | Mod: 26,LT,, | Performed by: ORTHOPAEDIC SURGERY

## 2023-02-28 PROCEDURE — 99024 PR POST-OP FOLLOW-UP VISIT: ICD-10-PCS | Mod: ,,, | Performed by: ORTHOPAEDIC SURGERY

## 2023-02-28 PROCEDURE — 99024 POSTOP FOLLOW-UP VISIT: CPT | Mod: ,,, | Performed by: ORTHOPAEDIC SURGERY

## 2023-02-28 PROCEDURE — 73560 XR KNEE 1 OR 2 VIEW LEFT: ICD-10-PCS | Mod: 26,LT,, | Performed by: ORTHOPAEDIC SURGERY

## 2023-02-28 NOTE — LETTER
February 28, 2023      [facility]  [address] [place] [id number]  Phone: [phone]  Fax: [phone]       Patient: Roberto Lara   YOB: 1960  Date of Visit: 02/28/2023    To Whom It May Concern:    Brant Lara  was at [facility] on 02/28/2023. The patient may return to work on 3/3/23 with no restrictions. If you have any questions or concerns, or if I can be of further assistance, please do not hesitate to contact me.    Sincerely,    Lucita Fitzpatrick III, M.D.

## 2023-02-28 NOTE — PROGRESS NOTES
CC:    Chief Complaint   Patient presents with    Follow-up     LT TKR 12/14 (11WKS)           Previos History :        History:  2/28/2023   Roberto Lara is a 62 y.o.  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  No fracture appreciated        Ass/Plan:  Doing well told him he will continue to improve for another 6 months I will see him back in 8 months no x-rays then unless there is a problem giving him a note to return to work on the 3rd of March        Ron Fitzpatrick III, MD    Subject to voice recognition errors,  transcription services are not allowed

## 2023-05-25 ENCOUNTER — OFFICE VISIT (OUTPATIENT)
Dept: CARDIOLOGY | Facility: CLINIC | Age: 63
End: 2023-05-25
Payer: COMMERCIAL

## 2023-05-25 VITALS
SYSTOLIC BLOOD PRESSURE: 120 MMHG | OXYGEN SATURATION: 97 % | DIASTOLIC BLOOD PRESSURE: 82 MMHG | WEIGHT: 167.63 LBS | BODY MASS INDEX: 26.94 KG/M2 | HEIGHT: 66 IN | HEART RATE: 58 BPM

## 2023-05-25 DIAGNOSIS — G47.33 OBSTRUCTIVE SLEEP APNEA OF ADULT: ICD-10-CM

## 2023-05-25 DIAGNOSIS — I48.0 PAROXYSMAL A-FIB: Primary | ICD-10-CM

## 2023-05-25 DIAGNOSIS — E66.01 MORBID OBESITY: ICD-10-CM

## 2023-05-25 DIAGNOSIS — M17.12 PRIMARY OSTEOARTHRITIS OF LEFT KNEE: ICD-10-CM

## 2023-05-25 PROCEDURE — 99214 PR OFFICE/OUTPT VISIT, EST, LEVL IV, 30-39 MIN: ICD-10-PCS | Mod: ,,, | Performed by: INTERNAL MEDICINE

## 2023-05-25 PROCEDURE — 3008F PR BODY MASS INDEX (BMI) DOCUMENTED: ICD-10-PCS | Mod: CPTII,,, | Performed by: INTERNAL MEDICINE

## 2023-05-25 PROCEDURE — 3079F DIAST BP 80-89 MM HG: CPT | Mod: CPTII,,, | Performed by: INTERNAL MEDICINE

## 2023-05-25 PROCEDURE — 3079F PR MOST RECENT DIASTOLIC BLOOD PRESSURE 80-89 MM HG: ICD-10-PCS | Mod: CPTII,,, | Performed by: INTERNAL MEDICINE

## 2023-05-25 PROCEDURE — 1159F PR MEDICATION LIST DOCUMENTED IN MEDICAL RECORD: ICD-10-PCS | Mod: CPTII,,, | Performed by: INTERNAL MEDICINE

## 2023-05-25 PROCEDURE — 1160F RVW MEDS BY RX/DR IN RCRD: CPT | Mod: CPTII,,, | Performed by: INTERNAL MEDICINE

## 2023-05-25 PROCEDURE — 3074F PR MOST RECENT SYSTOLIC BLOOD PRESSURE < 130 MM HG: ICD-10-PCS | Mod: CPTII,,, | Performed by: INTERNAL MEDICINE

## 2023-05-25 PROCEDURE — 1160F PR REVIEW ALL MEDS BY PRESCRIBER/CLIN PHARMACIST DOCUMENTED: ICD-10-PCS | Mod: CPTII,,, | Performed by: INTERNAL MEDICINE

## 2023-05-25 PROCEDURE — 3008F BODY MASS INDEX DOCD: CPT | Mod: CPTII,,, | Performed by: INTERNAL MEDICINE

## 2023-05-25 PROCEDURE — 1159F MED LIST DOCD IN RCRD: CPT | Mod: CPTII,,, | Performed by: INTERNAL MEDICINE

## 2023-05-25 PROCEDURE — 3074F SYST BP LT 130 MM HG: CPT | Mod: CPTII,,, | Performed by: INTERNAL MEDICINE

## 2023-05-25 PROCEDURE — 99214 OFFICE O/P EST MOD 30 MIN: CPT | Mod: ,,, | Performed by: INTERNAL MEDICINE

## 2023-06-04 NOTE — PROGRESS NOTES
Cardiology Clinic Note:    PCP: Pippa Portillo DO    REFERRING PHYSICIAN: Pippa Portillo DO    CHIEF COMPLAINT: Palpitations     HISTORY OF PRESENT ILLNESS: Roberto Lara is a 62 y.o. male who presents for evaluation of atrial fibrillation    Patient states heart racing and skipping has resolved, is monitoring several times a week. ..  He states he is walking the dog daily without provocation of heart racing, skipping or jumping.  He notes lessening  left knee pain, following left knee replacement.  He denies chest pain, pressure or diaphoresis.  He notes shortness of breath with  a fib with RVR has resolved.        Review of Systems   Constitutional: Negative for diaphoresis, malaise/fatigue, night sweats and weight gain.   HENT:  Negative for congestion, ear pain, hearing loss, nosebleeds and sore throat.    Eyes:  Negative for blurred vision, double vision, pain, photophobia and visual disturbance.   Cardiovascular:  Positive for palpitations. Negative for chest pain, claudication, dyspnea on exertion, irregular heartbeat, leg swelling, near-syncope, orthopnea and syncope.   Respiratory:  Positive for shortness of breath. Negative for cough, sleep disturbances due to breathing, snoring and wheezing.    Endocrine: Negative for cold intolerance, heat intolerance, polydipsia, polyphagia and polyuria.   Hematologic/Lymphatic: Negative for bleeding problem. Does not bruise/bleed easily.   Skin:  Negative for dry skin, flushing, itching, rash and skin cancer.   Musculoskeletal:  Positive for joint pain and joint swelling. Negative for arthritis, back pain, falls, muscle cramps, muscle weakness and myalgias.   Gastrointestinal:  Negative for abdominal pain, change in bowel habit, constipation, diarrhea, dysphagia, heartburn, nausea and vomiting.   Genitourinary:  Negative for bladder incontinence, dysuria, flank pain, frequency and nocturia.   Neurological:  Negative for dizziness, focal weakness, headaches,  light-headedness, loss of balance, numbness, paresthesias and seizures.   Psychiatric/Behavioral:  Positive for depression. Negative for memory loss and substance abuse. The patient is not nervous/anxious.    Allergic/Immunologic: Negative for environmental allergies.        PAST MEDICAL HISTORY:  Palpitations    PAST SURGICAL HISTORY:  Past Surgical History:   Procedure Laterality Date    ARTHROPLASTY, KNEE, TOTAL, USING COMPUTER-ASSISTED NAVIGATION Left 12/14/2022    Procedure: ARTHROPLASTY, KNEE, TOTAL, USING COMPUTER-ASSISTED NAVIGATION;  Surgeon: Ron Fitzpatrick III, MD;  Location: [facility];  Service: Orthopedics;  Laterality: Left;    cyst wrist Left     FOOT FRACTURE SURGERY Right     URINARY SURGERY         SOCIAL HISTORY:  Social History     Socioeconomic History    Marital status:    Tobacco Use    Smoking status: Never    Smokeless tobacco: Never   Substance and Sexual Activity    Alcohol use: Not Currently     Comment: rarely    Drug use: Never    Sexual activity: Yes       FAMILY HISTORY:  Family History   Problem Relation Age of Onset    Heart attack Mother     Hypertension Mother     Heart attack Paternal Uncle     Heart attack Maternal Grandfather        ALLERGIES:  Allergies as of 05/25/2023    (No Known Allergies)         MEDICATIONS:  Current Outpatient Medications on File Prior to Visit   Medication Sig Dispense Refill    aspirin (ECOTRIN) 325 MG EC tablet Take 1 tablet (325 mg total) by mouth 2 (two) times daily. (Patient taking differently: Take 325 mg by mouth once daily.)  0    DULoxetine (CYMBALTA) 20 MG capsule Take 20 mg by mouth once daily.      metoprolol tartrate (LOPRESSOR) 50 MG tablet Take 50 mg by mouth 2 (two) times daily.      tamsulosin (FLOMAX) 0.4 mg Cap Take by mouth once daily.      oxyCODONE-acetaminophen (PERCOCET) 7.5-325 mg per tablet Take 1 tablet by mouth every 4 (four) hours as needed for Pain. (Patient not taking: Reported on 1/25/2023) 20 tablet 0  "    No current facility-administered medications on file prior to visit.          PHYSICAL EXAM:  Blood pressure 120/82, pulse (!) 58, height 5' 6" (1.676 m), weight 76 kg (167 lb 9.6 oz), SpO2 97 %.  Wt Readings from Last 3 Encounters:   05/25/23 76 kg (167 lb 9.6 oz)   01/25/23 75.7 kg (166 lb 12.8 oz)   12/14/22 72.6 kg (160 lb)      Body mass index is 27.05 kg/m².    Physical Exam  Vitals and nursing note reviewed.   Constitutional:       Appearance: Normal appearance. He is normal weight.   HENT:      Head: Normocephalic and atraumatic.      Right Ear: External ear normal.      Left Ear: External ear normal.   Eyes:      General: No scleral icterus.        Right eye: No discharge.         Left eye: No discharge.      Extraocular Movements: Extraocular movements intact.      Conjunctiva/sclera: Conjunctivae normal.      Pupils: Pupils are equal, round, and reactive to light.   Cardiovascular:      Rate and Rhythm: Normal rate and regular rhythm.      Pulses: Normal pulses.      Heart sounds: Normal heart sounds. No murmur heard.    No friction rub. No gallop.   Pulmonary:      Effort: Pulmonary effort is normal.      Breath sounds: Normal breath sounds. No wheezing, rhonchi or rales.   Chest:      Chest wall: No tenderness.   Abdominal:      General: Abdomen is flat. Bowel sounds are normal. There is no distension.      Palpations: Abdomen is soft.      Tenderness: There is no abdominal tenderness. There is no guarding or rebound.   Musculoskeletal:         General: No swelling or tenderness. Normal range of motion.      Cervical back: Normal range of motion and neck supple.   Skin:     General: Skin is warm and dry.      Findings: No erythema or rash.   Neurological:      General: No focal deficit present.      Mental Status: He is alert and oriented to person, place, and time.      Cranial Nerves: No cranial nerve deficit.      Motor: No weakness.      Gait: Gait normal.   Psychiatric:         Mood and Affect: " Mood normal.         Behavior: Behavior normal.         Thought Content: Thought content normal.         Judgment: Judgment normal.        LABS REVIEWED:  Lab Results   Component Value Date    WBC 18.82 (H) 12/15/2022    RBC 4.00 (L) 12/15/2022    HGB 12.1 (L) 12/15/2022    HCT 36.8 (L) 12/15/2022    MCV 92.0 12/15/2022    MCH 30.3 12/15/2022    MCHC 32.9 12/15/2022    RDW 12.8 12/15/2022     12/15/2022    MPV 9.2 (L) 12/15/2022    NRBC 0.0 12/15/2022    INR 0.95 12/07/2022    INR 0.95 12/07/2022     Lab Results   Component Value Date     (L) 12/15/2022    K 4.5 12/15/2022     12/15/2022    CO2 25 12/15/2022    BUN 20 (H) 12/15/2022     Lab Results   Component Value Date    AST 21 12/15/2022    ALT 16 12/15/2022     Lab Results   Component Value Date     (H) 12/15/2022     No results found for: CHOL, HDL, LDL, TRIG, CHOLHDL    CARDIAC STUDIES REVIEWED:  EKG 3/10/22 - Afib with rapid ventricular rate.   ECHO 3/11/22 -  EF 55-60%.  Mild mitral regurgitation and mild tricuspid regurgitation   STRESS TEST 3/24/22 -   ZIO 3/31/22 -   Predominant NSR.  Two short runs of SVT,  Very rare PACs and PVCs.   No significant pauses.  Patient trigger events with NSR and PVCs.     OTHER IMAGING STUDIES REVIEWED:    ASSESSMENT:   Paroxysmal A-fib  -     EKG 12-lead; Future; Expected date: 05/25/2023    Primary osteoarthritis of left knee          PLAN:  1. Paroxysmal A fib, back in NSR on metoprolol, heart rate well controlled, on Toprol XL to 25 mg BID, continue to monitor  2. Left knee pain,has improved following  knee replacement  3. SATISH: sleep eval abnormal, recommended CPAP, pt resistant to using machine, long conversation about benefits of CPAP for control of A fib, pt reports will pursue obtaining CPAP thru sleep medicine clinic                                    Reval in six months, sooner if symptoms do not improve.

## 2023-11-09 ENCOUNTER — OFFICE VISIT (OUTPATIENT)
Dept: CARDIOLOGY | Facility: CLINIC | Age: 63
End: 2023-11-09
Payer: COMMERCIAL

## 2023-11-09 VITALS
SYSTOLIC BLOOD PRESSURE: 108 MMHG | WEIGHT: 165 LBS | DIASTOLIC BLOOD PRESSURE: 78 MMHG | HEART RATE: 57 BPM | BODY MASS INDEX: 26.52 KG/M2 | HEIGHT: 66 IN

## 2023-11-09 DIAGNOSIS — G47.33 OBSTRUCTIVE SLEEP APNEA OF ADULT: Primary | ICD-10-CM

## 2023-11-09 DIAGNOSIS — I48.0 PAROXYSMAL A-FIB: Primary | ICD-10-CM

## 2023-11-09 DIAGNOSIS — G47.33 OBSTRUCTIVE SLEEP APNEA OF ADULT: ICD-10-CM

## 2023-11-09 DIAGNOSIS — R06.81 APNEA: ICD-10-CM

## 2023-11-09 PROCEDURE — 3078F DIAST BP <80 MM HG: CPT | Mod: CPTII,,, | Performed by: INTERNAL MEDICINE

## 2023-11-09 PROCEDURE — 3074F PR MOST RECENT SYSTOLIC BLOOD PRESSURE < 130 MM HG: ICD-10-PCS | Mod: CPTII,,, | Performed by: INTERNAL MEDICINE

## 2023-11-09 PROCEDURE — 99214 OFFICE O/P EST MOD 30 MIN: CPT | Mod: ,,, | Performed by: INTERNAL MEDICINE

## 2023-11-09 PROCEDURE — 3078F PR MOST RECENT DIASTOLIC BLOOD PRESSURE < 80 MM HG: ICD-10-PCS | Mod: CPTII,,, | Performed by: INTERNAL MEDICINE

## 2023-11-09 PROCEDURE — 1160F RVW MEDS BY RX/DR IN RCRD: CPT | Mod: CPTII,,, | Performed by: INTERNAL MEDICINE

## 2023-11-09 PROCEDURE — 99214 PR OFFICE/OUTPT VISIT, EST, LEVL IV, 30-39 MIN: ICD-10-PCS | Mod: ,,, | Performed by: INTERNAL MEDICINE

## 2023-11-09 PROCEDURE — 3008F BODY MASS INDEX DOCD: CPT | Mod: CPTII,,, | Performed by: INTERNAL MEDICINE

## 2023-11-09 PROCEDURE — 3008F PR BODY MASS INDEX (BMI) DOCUMENTED: ICD-10-PCS | Mod: CPTII,,, | Performed by: INTERNAL MEDICINE

## 2023-11-09 PROCEDURE — 1159F MED LIST DOCD IN RCRD: CPT | Mod: CPTII,,, | Performed by: INTERNAL MEDICINE

## 2023-11-09 PROCEDURE — 3074F SYST BP LT 130 MM HG: CPT | Mod: CPTII,,, | Performed by: INTERNAL MEDICINE

## 2023-11-09 PROCEDURE — 1159F PR MEDICATION LIST DOCUMENTED IN MEDICAL RECORD: ICD-10-PCS | Mod: CPTII,,, | Performed by: INTERNAL MEDICINE

## 2023-11-09 PROCEDURE — 1160F PR REVIEW ALL MEDS BY PRESCRIBER/CLIN PHARMACIST DOCUMENTED: ICD-10-PCS | Mod: CPTII,,, | Performed by: INTERNAL MEDICINE

## 2023-11-09 NOTE — PROGRESS NOTES
Cardiology Clinic Note:    PCP: Pippa Portillo DO    REFERRING PHYSICIAN: Pippa Portillo DO    CHIEF COMPLAINT: Palpitations     HISTORY OF PRESENT ILLNESS: Roberto Lara is a 63 y.o. male who presents for evaluation of atrial fibrillation    Patient states symptoms of heart racing and skipping have resolved.  He states he is walking the dog daily and doing yard work without provocation of heart racing, skipping or jumping.  He notes lessening of eft knee pain, following left knee replacement.  He denies chest pain, pressure or diaphoresis.  He notes shortness of breath with  a fib with RVR has resolved.        Review of Systems   Constitutional: Negative for diaphoresis, malaise/fatigue, night sweats and weight gain.   HENT:  Negative for congestion, ear pain, hearing loss, nosebleeds and sore throat.    Eyes:  Negative for blurred vision, double vision, pain, photophobia and visual disturbance.   Cardiovascular:  Positive for palpitations. Negative for chest pain, claudication, dyspnea on exertion, irregular heartbeat, leg swelling, near-syncope, orthopnea and syncope.   Respiratory:  Positive for shortness of breath. Negative for cough, sleep disturbances due to breathing, snoring and wheezing.    Endocrine: Negative for cold intolerance, heat intolerance, polydipsia, polyphagia and polyuria.   Hematologic/Lymphatic: Negative for bleeding problem. Does not bruise/bleed easily.   Skin:  Negative for dry skin, flushing, itching, rash and skin cancer.   Musculoskeletal:  Positive for joint pain and joint swelling. Negative for arthritis, back pain, falls, muscle cramps, muscle weakness and myalgias.   Gastrointestinal:  Negative for abdominal pain, change in bowel habit, constipation, diarrhea, dysphagia, heartburn, nausea and vomiting.   Genitourinary:  Negative for bladder incontinence, dysuria, flank pain, frequency and nocturia.   Neurological:  Negative for dizziness, focal weakness, headaches,  "light-headedness, loss of balance, numbness, paresthesias and seizures.   Psychiatric/Behavioral:  Positive for depression. Negative for memory loss and substance abuse. The patient is not nervous/anxious.    Allergic/Immunologic: Negative for environmental allergies.          PAST MEDICAL HISTORY:  Palpitations    PAST SURGICAL HISTORY:  Past Surgical History:   Procedure Laterality Date    ARTHROPLASTY, KNEE, TOTAL, USING COMPUTER-ASSISTED NAVIGATION Left 12/14/2022    Procedure: ARTHROPLASTY, KNEE, TOTAL, USING COMPUTER-ASSISTED NAVIGATION;  Surgeon: Ron Fitzpatrick III, MD;  Location: [facility];  Service: Orthopedics;  Laterality: Left;    cyst wrist Left     FOOT FRACTURE SURGERY Right     URINARY SURGERY         SOCIAL HISTORY:  Social History     Socioeconomic History    Marital status:    Tobacco Use    Smoking status: Never    Smokeless tobacco: Never   Substance and Sexual Activity    Alcohol use: Not Currently     Comment: rarely    Drug use: Never    Sexual activity: Yes       FAMILY HISTORY:  Family History   Problem Relation Age of Onset    Heart attack Mother     Hypertension Mother     Heart attack Paternal Uncle     Heart attack Maternal Grandfather        ALLERGIES:  Allergies as of 11/09/2023    (No Known Allergies)         MEDICATIONS:  Current Outpatient Medications on File Prior to Visit   Medication Sig Dispense Refill    aspirin (ECOTRIN) 325 MG EC tablet Take 1 tablet (325 mg total) by mouth 2 (two) times daily. (Patient taking differently: Take 325 mg by mouth once daily.)  0    DULoxetine (CYMBALTA) 20 MG capsule Take 20 mg by mouth once daily.      metoprolol tartrate (LOPRESSOR) 50 MG tablet Take 50 mg by mouth 2 (two) times daily.      tamsulosin (FLOMAX) 0.4 mg Cap Take by mouth once daily.       No current facility-administered medications on file prior to visit.          PHYSICAL EXAM:  Blood pressure 108/78, pulse (!) 57, height 5' 6" (1.676 m), weight 74.8 kg (165 " lb).  Wt Readings from Last 3 Encounters:   11/09/23 74.8 kg (165 lb)   05/25/23 76 kg (167 lb 9.6 oz)   01/25/23 75.7 kg (166 lb 12.8 oz)      Body mass index is 26.63 kg/m².    Physical Exam  Vitals and nursing note reviewed.   Constitutional:       Appearance: Normal appearance. He is normal weight.   HENT:      Head: Normocephalic and atraumatic.      Right Ear: External ear normal.      Left Ear: External ear normal.   Eyes:      General: No scleral icterus.        Right eye: No discharge.         Left eye: No discharge.      Extraocular Movements: Extraocular movements intact.      Conjunctiva/sclera: Conjunctivae normal.      Pupils: Pupils are equal, round, and reactive to light.   Cardiovascular:      Rate and Rhythm: Normal rate and regular rhythm.      Pulses: Normal pulses.      Heart sounds: Normal heart sounds. No murmur heard.     No friction rub. No gallop.   Pulmonary:      Effort: Pulmonary effort is normal.      Breath sounds: Normal breath sounds. No wheezing, rhonchi or rales.   Chest:      Chest wall: No tenderness.   Abdominal:      General: Abdomen is flat. Bowel sounds are normal. There is no distension.      Palpations: Abdomen is soft.      Tenderness: There is no abdominal tenderness. There is no guarding or rebound.   Musculoskeletal:         General: No swelling or tenderness. Normal range of motion.      Cervical back: Normal range of motion and neck supple.   Skin:     General: Skin is warm and dry.      Findings: No erythema or rash.   Neurological:      General: No focal deficit present.      Mental Status: He is alert and oriented to person, place, and time.      Cranial Nerves: No cranial nerve deficit.      Motor: No weakness.      Gait: Gait normal.   Psychiatric:         Mood and Affect: Mood normal.         Behavior: Behavior normal.         Thought Content: Thought content normal.         Judgment: Judgment normal.          LABS REVIEWED:  Lab Results   Component Value Date  "   WBC 18.82 (H) 12/15/2022    RBC 4.00 (L) 12/15/2022    HGB 12.1 (L) 12/15/2022    HCT 36.8 (L) 12/15/2022    MCV 92.0 12/15/2022    MCH 30.3 12/15/2022    MCHC 32.9 12/15/2022    RDW 12.8 12/15/2022     12/15/2022    MPV 9.2 (L) 12/15/2022    NRBC 0.0 12/15/2022    INR 0.95 12/07/2022    INR 0.95 12/07/2022     Lab Results   Component Value Date     (L) 12/15/2022    K 4.5 12/15/2022     12/15/2022    CO2 25 12/15/2022    BUN 20 (H) 12/15/2022     Lab Results   Component Value Date    AST 21 12/15/2022    ALT 16 12/15/2022     Lab Results   Component Value Date     (H) 12/15/2022     No results found for: "CHOL", "HDL", "LDL", "TRIG", "CHOLHDL"    CARDIAC STUDIES REVIEWED:  EKG 3/10/22 - Afib with rapid ventricular rate.   ECHO 3/11/22 -  EF 55-60%.  Mild mitral regurgitation and mild tricuspid regurgitation   STRESS TEST 3/24/22 -   ZIO 3/31/22 -   Predominant NSR.  Two short runs of SVT,  Very rare PACs and PVCs.   No significant pauses.  Patient trigger events with NSR and PVCs.     OTHER IMAGING STUDIES REVIEWED:    ASSESSMENT:   Paroxysmal A-fib  -     EKG 12-lead; Future    Obstructive sleep apnea of adult    Apnea          PLAN:  1. Paroxysmal A fib, back in NSR on metoprolol, heart rate well controlled, on Toprol XL to 25 mg BID, on ASA only for primary stroke risk reduction for PAF  2. Left knee pain,has improved following  knee replacement  3. SATISH: sleep eval abnormal, recommended CPAP, pt reports will pursue obtaining CPAP thru sleep medicine clinic, refer to sleep medicine                                   Reval in six months, sooner if symptoms do not improve.          "

## 2023-12-07 ENCOUNTER — OFFICE VISIT (OUTPATIENT)
Dept: CARDIOLOGY | Facility: CLINIC | Age: 63
End: 2023-12-07
Payer: COMMERCIAL

## 2023-12-07 VITALS
HEIGHT: 66 IN | OXYGEN SATURATION: 98 % | HEART RATE: 56 BPM | BODY MASS INDEX: 27.06 KG/M2 | DIASTOLIC BLOOD PRESSURE: 68 MMHG | WEIGHT: 168.38 LBS | SYSTOLIC BLOOD PRESSURE: 118 MMHG

## 2023-12-07 DIAGNOSIS — G47.33 OBSTRUCTIVE SLEEP APNEA OF ADULT: ICD-10-CM

## 2023-12-07 DIAGNOSIS — R06.81 APNEA: ICD-10-CM

## 2023-12-07 DIAGNOSIS — I48.0 PAROXYSMAL A-FIB: Primary | ICD-10-CM

## 2023-12-07 DIAGNOSIS — Z96.652 STATUS POST TOTAL LEFT KNEE REPLACEMENT: ICD-10-CM

## 2023-12-07 DIAGNOSIS — M17.12 PRIMARY OSTEOARTHRITIS OF LEFT KNEE: ICD-10-CM

## 2023-12-07 PROCEDURE — 3074F PR MOST RECENT SYSTOLIC BLOOD PRESSURE < 130 MM HG: ICD-10-PCS | Mod: CPTII,,, | Performed by: INTERNAL MEDICINE

## 2023-12-07 PROCEDURE — 99214 OFFICE O/P EST MOD 30 MIN: CPT | Mod: ,,, | Performed by: INTERNAL MEDICINE

## 2023-12-07 PROCEDURE — 99214 PR OFFICE/OUTPT VISIT, EST, LEVL IV, 30-39 MIN: ICD-10-PCS | Mod: ,,, | Performed by: INTERNAL MEDICINE

## 2023-12-07 PROCEDURE — 3008F BODY MASS INDEX DOCD: CPT | Mod: CPTII,,, | Performed by: INTERNAL MEDICINE

## 2023-12-07 PROCEDURE — 3074F SYST BP LT 130 MM HG: CPT | Mod: CPTII,,, | Performed by: INTERNAL MEDICINE

## 2023-12-07 PROCEDURE — 1159F PR MEDICATION LIST DOCUMENTED IN MEDICAL RECORD: ICD-10-PCS | Mod: CPTII,,, | Performed by: INTERNAL MEDICINE

## 2023-12-07 PROCEDURE — 3078F DIAST BP <80 MM HG: CPT | Mod: CPTII,,, | Performed by: INTERNAL MEDICINE

## 2023-12-07 PROCEDURE — 3078F PR MOST RECENT DIASTOLIC BLOOD PRESSURE < 80 MM HG: ICD-10-PCS | Mod: CPTII,,, | Performed by: INTERNAL MEDICINE

## 2023-12-07 PROCEDURE — 3008F PR BODY MASS INDEX (BMI) DOCUMENTED: ICD-10-PCS | Mod: CPTII,,, | Performed by: INTERNAL MEDICINE

## 2023-12-07 PROCEDURE — 1159F MED LIST DOCD IN RCRD: CPT | Mod: CPTII,,, | Performed by: INTERNAL MEDICINE

## 2023-12-07 RX ORDER — CITALOPRAM 10 MG/1
10 TABLET ORAL DAILY
COMMUNITY
End: 2023-12-07

## 2023-12-07 NOTE — PROGRESS NOTES
Cardiology Clinic Note:    PCP: Pippa Portillo DO    REFERRING PHYSICIAN: Pippa Portillo DO    CHIEF COMPLAINT: Palpitations     HISTORY OF PRESENT ILLNESS: Roberto Lara is a 63 y.o. male who presents for evaluation of atrial fibrillation                                              1. Paroxysmal A fib, back in NSR on metoprolol, heart rate well controlled, on Toprol XL to 25 mg BID, on ASA only for primary stroke risk reduction for PAF  2. Left knee pain,has improved following  knee replacement  3. SATISH: sleep eval abnormal, recommended CPAP, pt reports will pursue obtaining CPAP thru sleep medicine clinic, refer to sleep medicine                                                                                                                              Patient states two symptomatic episodes of A fib with RVR in the last two weeks, both started and ended spontaneously.  He was evaluated in ER, ruled out for acute myocardial necrosis, converted to NSR, was discharged to home.  symptoms of heart racing and skipping have resolved.  He reports continues walking the dog daily and doing yard work without provocation of heart racing, skipping or jumping.  He notes lessening of left knee pain, following left knee replacement.  He denies chest pain, pressure or diaphoresis.  He notes shortness of breath with  a fib with RVR has resolved. He did not complete sleep eval as previously ordered.        Review of Systems   Constitutional: Negative for diaphoresis, malaise/fatigue, night sweats and weight gain.   HENT:  Negative for congestion, ear pain, hearing loss, nosebleeds and sore throat.    Eyes:  Negative for blurred vision, double vision, pain, photophobia and visual disturbance.   Cardiovascular:  Positive for palpitations. Negative for chest pain, claudication, dyspnea on exertion, irregular heartbeat, leg swelling, near-syncope, orthopnea and syncope.   Respiratory:  Positive for shortness of breath.  Negative for cough, sleep disturbances due to breathing, snoring and wheezing.    Endocrine: Negative for cold intolerance, heat intolerance, polydipsia, polyphagia and polyuria.   Hematologic/Lymphatic: Negative for bleeding problem. Does not bruise/bleed easily.   Skin:  Negative for dry skin, flushing, itching, rash and skin cancer.   Musculoskeletal:  Positive for joint pain and joint swelling. Negative for arthritis, back pain, falls, muscle cramps, muscle weakness and myalgias.   Gastrointestinal:  Negative for abdominal pain, change in bowel habit, constipation, diarrhea, dysphagia, heartburn, nausea and vomiting.   Genitourinary:  Negative for bladder incontinence, dysuria, flank pain, frequency and nocturia.   Neurological:  Negative for dizziness, focal weakness, headaches, light-headedness, loss of balance, numbness, paresthesias and seizures.   Psychiatric/Behavioral:  Positive for depression. Negative for memory loss and substance abuse. The patient is not nervous/anxious.    Allergic/Immunologic: Negative for environmental allergies.          PAST MEDICAL HISTORY:  Palpitations    PAST SURGICAL HISTORY:  Past Surgical History:   Procedure Laterality Date    ARTHROPLASTY, KNEE, TOTAL, USING COMPUTER-ASSISTED NAVIGATION Left 12/14/2022    Procedure: ARTHROPLASTY, KNEE, TOTAL, USING COMPUTER-ASSISTED NAVIGATION;  Surgeon: Ron Fitzpatrick III, MD;  Location: [facility];  Service: Orthopedics;  Laterality: Left;    cyst wrist Left     FOOT FRACTURE SURGERY Right     URINARY SURGERY         SOCIAL HISTORY:  Social History     Socioeconomic History    Marital status:    Tobacco Use    Smoking status: Never    Smokeless tobacco: Never   Substance and Sexual Activity    Alcohol use: Not Currently     Comment: rarely    Drug use: Never    Sexual activity: Yes       FAMILY HISTORY:  Family History   Problem Relation Age of Onset    Heart attack Mother     Hypertension Mother     Heart attack  Paternal Uncle     Heart attack Maternal Grandfather        ALLERGIES:  Allergies as of 12/07/2023    (No Known Allergies)         MEDICATIONS:  Current Outpatient Medications on File Prior to Visit   Medication Sig Dispense Refill    aspirin (ECOTRIN) 325 MG EC tablet Take 1 tablet (325 mg total) by mouth 2 (two) times daily. (Patient taking differently: Take 325 mg by mouth once daily.)  0    DULoxetine (CYMBALTA) 20 MG capsule Take 20 mg by mouth once daily.      metoprolol tartrate (LOPRESSOR) 50 MG tablet Take 50 mg by mouth 2 (two) times daily.      tamsulosin (FLOMAX) 0.4 mg Cap Take by mouth once daily.       No current facility-administered medications on file prior to visit.          PHYSICAL EXAM:  There were no vitals taken for this visit.  Wt Readings from Last 3 Encounters:   11/09/23 74.8 kg (165 lb)   05/25/23 76 kg (167 lb 9.6 oz)   01/25/23 75.7 kg (166 lb 12.8 oz)      There is no height or weight on file to calculate BMI.    Physical Exam  Vitals and nursing note reviewed.   Constitutional:       Appearance: Normal appearance. He is normal weight.   HENT:      Head: Normocephalic and atraumatic.      Right Ear: External ear normal.      Left Ear: External ear normal.   Eyes:      General: No scleral icterus.        Right eye: No discharge.         Left eye: No discharge.      Extraocular Movements: Extraocular movements intact.      Conjunctiva/sclera: Conjunctivae normal.      Pupils: Pupils are equal, round, and reactive to light.   Cardiovascular:      Rate and Rhythm: Normal rate and regular rhythm.      Pulses: Normal pulses.      Heart sounds: Normal heart sounds. No murmur heard.     No friction rub. No gallop.   Pulmonary:      Effort: Pulmonary effort is normal.      Breath sounds: Normal breath sounds. No wheezing, rhonchi or rales.   Chest:      Chest wall: No tenderness.   Abdominal:      General: Abdomen is flat. Bowel sounds are normal. There is no distension.      Palpations:  "Abdomen is soft.      Tenderness: There is no abdominal tenderness. There is no guarding or rebound.   Musculoskeletal:         General: No swelling or tenderness. Normal range of motion.      Cervical back: Normal range of motion and neck supple.   Skin:     General: Skin is warm and dry.      Findings: No erythema or rash.   Neurological:      General: No focal deficit present.      Mental Status: He is alert and oriented to person, place, and time.      Cranial Nerves: No cranial nerve deficit.      Motor: No weakness.      Gait: Gait normal.   Psychiatric:         Mood and Affect: Mood normal.         Behavior: Behavior normal.         Thought Content: Thought content normal.         Judgment: Judgment normal.          LABS REVIEWED:  Lab Results   Component Value Date    WBC 18.82 (H) 12/15/2022    RBC 4.00 (L) 12/15/2022    HGB 12.1 (L) 12/15/2022    HCT 36.8 (L) 12/15/2022    MCV 92.0 12/15/2022    MCH 30.3 12/15/2022    MCHC 32.9 12/15/2022    RDW 12.8 12/15/2022     12/15/2022    MPV 9.2 (L) 12/15/2022    NRBC 0.0 12/15/2022    INR 0.95 12/07/2022    INR 0.95 12/07/2022     Lab Results   Component Value Date     (L) 12/15/2022    K 4.5 12/15/2022     12/15/2022    CO2 25 12/15/2022    BUN 20 (H) 12/15/2022     Lab Results   Component Value Date    AST 21 12/15/2022    ALT 16 12/15/2022     Lab Results   Component Value Date     (H) 12/15/2022     No results found for: "CHOL", "HDL", "LDL", "TRIG", "CHOLHDL"    CARDIAC STUDIES REVIEWED:  EKG 3/10/22 - Afib with rapid ventricular rate.   ECHO 3/11/22 -  EF 55-60%.  Mild mitral regurgitation and mild tricuspid regurgitation   STRESS TEST 3/24/22 -   ZIO 3/31/22 -   Predominant NSR.  Two short runs of SVT,  Very rare PACs and PVCs.   No significant pauses.  Patient trigger events with NSR and PVCs.     OTHER IMAGING STUDIES REVIEWED:    ASSESSMENT:   Paroxysmal A-fib    Apnea          PLAN:  1. Paroxysmal A fib, back in NSR, continues " on metoprolol, will place on outpatient telemetry with John.  Discussed systemic anticoagulation for primary stroke risk reduction, declines at present, will readdress pending determination of A fib burden, Discussed changes in antiarrhythmic tx versus referral for possible A fib ablation, further recs pending results of Zio.       2. Left knee pain,has improved following  knee replacement  3. SATISH: sleep eval abnormal, recommended CPAP, pt did notl pursue obtaining CPAP, will refer to sleep medicine clinic, long conversation with patient and wife about contribution of untreated SATISH to a Fib burden                                   Reval pending results of outpatient telemetry, sooner if symptoms do not improve.

## (undated) DEVICE — SCRUB DYNA-HEX LIQ 4% CHG 4OZ

## (undated) DEVICE — BLADE RECIP DOUBLE SIDED

## (undated) DEVICE — SYR ONLY LUER LOCK 20CC

## (undated) DEVICE — BANDAGE SURE-WRAP 6INX5YD

## (undated) DEVICE — GLOVE 7.0 PROTEXIS PI BLUE

## (undated) DEVICE — NDL QUINCKE SPINAL 20G 3.5IN

## (undated) DEVICE — GLOVE 7.5 PROTEXIS PI BLUE

## (undated) DEVICE — WRAP KNEE 4 ICE PACK L XL

## (undated) DEVICE — SOL NACL IRR 3000ML

## (undated) DEVICE — SYS KNEE EPAK PIN ATTUNE
Type: IMPLANTABLE DEVICE | Site: KNEE | Status: NON-FUNCTIONAL
Removed: 2022-12-14

## (undated) DEVICE — GLOVE BIOGEL SKINSENSE PI 7.5

## (undated) DEVICE — GLOVE BIOGEL SKINSENSE PI 7.0

## (undated) DEVICE — KIT IRR SUCTION HND PIECE

## (undated) DEVICE — TOWER MIX CEMENT BONE SMARTMIX

## (undated) DEVICE — COMPR KNEE STRAIGHT STAY 20IN

## (undated) DEVICE — PAD CAST SPECIALIST STRL 3

## (undated) DEVICE — SUT 2-0 VICRYL / CT-1

## (undated) DEVICE — TRAY SKIN SCRUB WET PREMIUM

## (undated) DEVICE — TOURNIQUET SB QC SP 34X4IN

## (undated) DEVICE — PIN SCHANZ 3X100MM
Type: IMPLANTABLE DEVICE | Site: KNEE | Status: NON-FUNCTIONAL
Removed: 2022-12-14

## (undated) DEVICE — SPHERE MARKER REFLECTIVE DISP

## (undated) DEVICE — GLOVE BIOGEL SKINSENSE PI 6.5

## (undated) DEVICE — CANISTER SUCTION MEDI-VAC 12L

## (undated) DEVICE — SKIN STAPLER PMR35

## (undated) DEVICE — NDL FILTER 5 MICRON 19GA1IN B-

## (undated) DEVICE — DRAPE THREE-QTR REINF 53X77IN

## (undated) DEVICE — PIN SCHANZ 4X130MM
Type: IMPLANTABLE DEVICE | Site: KNEE | Status: NON-FUNCTIONAL
Removed: 2022-12-14

## (undated) DEVICE — SOLIDIFIER BTL W/TREAT 1500CC

## (undated) DEVICE — SOL NACL IRR 1000ML BTL

## (undated) DEVICE — GLOVE BIOGEL SKINSENSE PI 8.0

## (undated) DEVICE — DRAPE INCISE IOBAN 2 23X23IN

## (undated) DEVICE — BLADE PERFORMANCE SAG 21X90MM

## (undated) DEVICE — SUT #2 TI-CRON HGS-21 30IN

## (undated) DEVICE — KIT TOTAL KNEE RUSH

## (undated) DEVICE — KIT EVACUATOR 3 SPR  DRN 400CC

## (undated) DEVICE — OVERLAY MATTRESS WAFFLE

## (undated) DEVICE — DRESSING AQUACEL FOAM RECT 6X6

## (undated) DEVICE — GOWN TOGA SYS PEELWY ZIP 2 XL

## (undated) DEVICE — SCRUB 10% POVIDONE IODINE 4OZ